# Patient Record
Sex: FEMALE | Race: WHITE | NOT HISPANIC OR LATINO | Employment: FULL TIME | URBAN - METROPOLITAN AREA
[De-identification: names, ages, dates, MRNs, and addresses within clinical notes are randomized per-mention and may not be internally consistent; named-entity substitution may affect disease eponyms.]

---
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
## 2019-02-25 ENCOUNTER — OFFICE VISIT (OUTPATIENT)
Dept: FAMILY MEDICINE | Facility: CLINIC | Age: 19
End: 2019-02-25
Payer: COMMERCIAL

## 2019-02-25 VITALS
OXYGEN SATURATION: 99 % | TEMPERATURE: 97.8 F | WEIGHT: 180.2 LBS | SYSTOLIC BLOOD PRESSURE: 118 MMHG | HEIGHT: 67 IN | RESPIRATION RATE: 18 BRPM | DIASTOLIC BLOOD PRESSURE: 84 MMHG | BODY MASS INDEX: 28.28 KG/M2 | HEART RATE: 101 BPM

## 2019-02-25 DIAGNOSIS — J02.9 PHARYNGITIS, UNSPECIFIED ETIOLOGY: Primary | ICD-10-CM

## 2019-02-25 DIAGNOSIS — J01.00 ACUTE NON-RECURRENT MAXILLARY SINUSITIS: ICD-10-CM

## 2019-02-25 LAB
DEPRECATED S PYO AG THROAT QL EIA: NORMAL
HETEROPH AB SER QL: NEGATIVE
SPECIMEN SOURCE: NORMAL

## 2019-02-25 PROCEDURE — 87880 STREP A ASSAY W/OPTIC: CPT | Performed by: NURSE PRACTITIONER

## 2019-02-25 PROCEDURE — 87081 CULTURE SCREEN ONLY: CPT | Performed by: NURSE PRACTITIONER

## 2019-02-25 PROCEDURE — 86308 HETEROPHILE ANTIBODY SCREEN: CPT | Performed by: NURSE PRACTITIONER

## 2019-02-25 PROCEDURE — 36415 COLL VENOUS BLD VENIPUNCTURE: CPT | Performed by: NURSE PRACTITIONER

## 2019-02-25 PROCEDURE — 99213 OFFICE O/P EST LOW 20 MIN: CPT | Performed by: NURSE PRACTITIONER

## 2019-02-25 RX ORDER — AMOXICILLIN 500 MG/1
500 CAPSULE ORAL 3 TIMES DAILY
Qty: 21 CAPSULE | Refills: 0 | Status: SHIPPED | OUTPATIENT
Start: 2019-02-25 | End: 2019-03-04

## 2019-02-25 ASSESSMENT — MIFFLIN-ST. JEOR: SCORE: 1616.04

## 2019-02-25 NOTE — PROGRESS NOTES
"  SUBJECTIVE:   Tiana Sumner is a 19 year old female who presents to clinic today for the following health issues:    ENT Symptoms             Symptoms: cc Present Absent Comment   Fever/Chills  x  Chills, sweating last night    Fatigue  x     Muscle Aches  x     Eye Irritation   x    Sneezing   x    Nasal Hermilo/Drg   x    Sinus Pressure/Pain   x    Loss of smell   x    Dental pain   x    Sore Throat  x     Swollen Glands  x     Ear Pain/Fullness  x  Otalgia both ears    Cough   x    Wheeze   x    Chest Pain   x    Shortness of breath   x    Rash   x    Other   x      Symptom duration: 5 days    Symptom severity:  moderate    Treatments tried:  dayquil, advil cold and sinus    Contacts:  work at [employer]      Problem list and histories reviewed & adjusted, as indicated.  Additional history: as documented    Labs reviewed in EPIC    Reviewed and updated as needed this visit by clinical staff       Reviewed and updated as needed this visit by Provider         ROS:  Constitutional, HEENT, cardiovascular, pulmonary, gi and gu systems are negative, except as otherwise noted.    OBJECTIVE:     /84 (BP Location: Left arm, Patient Position: Sitting, Cuff Size: Adult Regular)   Pulse 101   Temp 97.8  F (36.6  C) (Tympanic)   Resp 18   Ht 1.695 m (5' 6.75\")   Wt 81.7 kg (180 lb 3.2 oz)   SpO2 99%   BMI 28.44 kg/m    Body mass index is 28.44 kg/m .  GENERAL: healthy, alert and no distress  EYES: Eyes grossly normal to inspection, PERRL and conjunctivae and sclerae normal  HENT: ear canals and TM's normal, nose and mouth without ulcers or lesions, tonsillar erythema   NECK: bilateral anterior cervical adenopathy  RESP: lungs clear to auscultation - no rales, rhonchi or wheezes  CV: regular rate and rhythm, normal S1 S2, no S3 or S4, no murmur, click or rub, no peripheral edema and peripheral pulses strong  SKIN: no suspicious lesions or rashes    Diagnostic Test Results:  Results for orders placed or performed " in visit on 02/25/19 (from the past 24 hour(s))   Strep, Rapid Screen   Result Value Ref Range    Specimen Description Throat     Rapid Strep A Screen       NEGATIVE: No Group A streptococcal antigen detected by immunoassay, await culture report.   Mononucleosis screen   Result Value Ref Range    Mononucleosis Screen Negative NEG^Negative       ASSESSMENT/PLAN:     1. Pharyngitis, unspecified etiology  -sore throat can be due to post-nasal drainage  -discussed symptomatic treatment   - Strep, Rapid Screen  - Beta strep group A culture  - Mononucleosis screen    2. Acute non-recurrent maxillary sinusitis    - amoxicillin (AMOXIL) 500 MG capsule; Take 1 capsule (500 mg) by mouth 3 times daily for 7 days  Dispense: 21 capsule; Refill: 0    See Patient Instructions    KATELYN Blevins [facility]

## 2019-02-25 NOTE — LETTER
March 1, 2019      Tiana Sumner  [address] [facility] [address]        The results of your recent throat culture were negative.  If you have any further questions or concerns please contact the clinic.            Sincerely,        KATELYN Blevins CNP/dk

## 2019-02-25 NOTE — PATIENT INSTRUCTIONS
Mono test  If negative will start Amoxicillin 500 mg 3 times daily for 7 days for sinus infection  Symptomatic treatment: Cepacol lozenges as needed, drink more fluids

## 2019-02-25 NOTE — LETTER
[facility]  [address] [facility] [address]  Phone: [phone]    February 25, 2019        Tiana Sumner  [id number] [facility] [address]          To whom it may concern:    RE: Tiana Sumner    Patient was seen and treated today at our clinic and missed work.  Patient may return to work 2/28 th  with the following:  No restrictions    Please contact me for questions or concerns.      Sincerely,        KATELYN Blevins CNP

## 2019-02-26 ENCOUNTER — TELEPHONE (OUTPATIENT)
Dept: FAMILY MEDICINE | Facility: CLINIC | Age: 19
End: 2019-02-26

## 2019-02-26 LAB
BACTERIA SPEC CULT: NORMAL
SPECIMEN SOURCE: NORMAL

## 2019-02-26 NOTE — TELEPHONE ENCOUNTER
Panel Management Review      Patient has the following on her problem list:     Depression / Dysthymia review    Measure:  Needs PHQ-9 score of 4 or less during index window.  Administer PHQ-9 and if score is 5 or more, send encounter to provider for next steps.      PHQ-9 SCORE 11/1/2017 11/28/2017 3/5/2018   PHQ-9 Total Score 16 2 1       If PHQ-9 recheck is 5 or more, route to provider for next steps.    Patient is due for:  PHQ9    Asthma review     ACT Total Scores 8/29/2017   ACT TOTAL SCORE -   ASTHMA ER VISITS -   ASTHMA HOSPITALIZATIONS -   ACT TOTAL SCORE (Goal Greater than or Equal to 20) 25   In the past 12 months, how many times did you visit the emergency room for your asthma without being admitted to the hospital? 0   In the past 12 months, how many times were you hospitalized overnight because of your asthma? 0      1. Is Asthma diagnosis on the Problem List? Yes    2. Is Asthma listed on Health Maintenance? Yes    3. Patient is due for:  ACT      Composite cancer screening  Chart review shows that this patient is due/due soon for the following None  Summary:    Patient is due/failing the following:   ACT and PHQ9    Action needed:   Patient needs to do ACT. and Patient needs to do PHQ9.    Type of outreach:    Letter mailed with questionnaires for the patient to complete and return.    Questions for provider review:    None                                                                                                                                    MYKEL Chacon MA

## 2019-02-26 NOTE — LETTER
February 26, 2019      Tiana Sumner  [address] [facility] [address]        Dear Tiana,     In order to ensure we are providing the best quality care, we have reviewed your chart and see that you are due for:  An update for the depression/anxiety and asthma questionnaires.  These tools help your provider to monitor and manage your symptoms and treatment plan.  Please complete the enclosed forms and return in the provided envelope.    Please call the clinic with any questions or concerns.    Thank you for trusting us with your health care.  Sincerely,    Your [facility] Team/lw

## 2019-04-01 ENCOUNTER — RECORDS - HEALTHEAST (OUTPATIENT)
Dept: LAB | Facility: HOSPITAL | Age: 19
End: 2019-04-01

## 2019-04-03 ENCOUNTER — OFFICE VISIT (OUTPATIENT)
Dept: FAMILY MEDICINE | Facility: CLINIC | Age: 19
End: 2019-04-03
Payer: COMMERCIAL

## 2019-04-03 VITALS
WEIGHT: 178.6 LBS | HEART RATE: 61 BPM | HEIGHT: 67 IN | DIASTOLIC BLOOD PRESSURE: 70 MMHG | RESPIRATION RATE: 18 BRPM | BODY MASS INDEX: 28.03 KG/M2 | SYSTOLIC BLOOD PRESSURE: 122 MMHG | TEMPERATURE: 97.7 F | OXYGEN SATURATION: 99 %

## 2019-04-03 DIAGNOSIS — F41.1 GENERALIZED ANXIETY DISORDER: Primary | ICD-10-CM

## 2019-04-03 LAB
GAMMA INTERFERON BACKGROUND BLD IA-ACNC: 0.02 IU/ML
M TB IFN-G BLD-IMP: NEGATIVE
MITOGEN IGNF BCKGRD COR BLD-ACNC: 0 IU/ML
MITOGEN IGNF BCKGRD COR BLD-ACNC: 0 IU/ML
QTF INTERPRETATION: NORMAL
QTF MITOGEN - NIL: 8.01 IU/ML

## 2019-04-03 PROCEDURE — 99213 OFFICE O/P EST LOW 20 MIN: CPT | Performed by: NURSE PRACTITIONER

## 2019-04-03 RX ORDER — BUSPIRONE HYDROCHLORIDE 10 MG/1
10 TABLET ORAL 2 TIMES DAILY
Qty: 60 TABLET | Refills: 2 | Status: SHIPPED | OUTPATIENT
Start: 2019-04-03 | End: 2019-06-05

## 2019-04-03 ASSESSMENT — ANXIETY QUESTIONNAIRES
2. NOT BEING ABLE TO STOP OR CONTROL WORRYING: NEARLY EVERY DAY
1. FEELING NERVOUS, ANXIOUS, OR ON EDGE: NEARLY EVERY DAY
5. BEING SO RESTLESS THAT IT IS HARD TO SIT STILL: NEARLY EVERY DAY
3. WORRYING TOO MUCH ABOUT DIFFERENT THINGS: NEARLY EVERY DAY
6. BECOMING EASILY ANNOYED OR IRRITABLE: NEARLY EVERY DAY
IF YOU CHECKED OFF ANY PROBLEMS ON THIS QUESTIONNAIRE, HOW DIFFICULT HAVE THESE PROBLEMS MADE IT FOR YOU TO DO YOUR WORK, TAKE CARE OF THINGS AT HOME, OR GET ALONG WITH OTHER PEOPLE: EXTREMELY DIFFICULT
7. FEELING AFRAID AS IF SOMETHING AWFUL MIGHT HAPPEN: NEARLY EVERY DAY
GAD7 TOTAL SCORE: 21

## 2019-04-03 ASSESSMENT — PATIENT HEALTH QUESTIONNAIRE - PHQ9
SUM OF ALL RESPONSES TO PHQ QUESTIONS 1-9: 7
5. POOR APPETITE OR OVEREATING: NEARLY EVERY DAY

## 2019-04-03 ASSESSMENT — MIFFLIN-ST. JEOR: SCORE: 1608.78

## 2019-04-03 NOTE — PROGRESS NOTES
"  SUBJECTIVE:   Tiana Sumner is a 19 year old female who presents to clinic today for the following health issues:    Chief Complaint   Patient presents with     Anxiety     Recheck, has tried Prozac and Zoloft and they have not worked for her in the past, wants to discuss getting on something else       Anxiety Follow-Up    Status since last visit: Worsened- having anxiety all the time not just situational     Other associated symptoms:None    Complicating factors:     Significant life event: Yes-  New job      Current substance abuse: None    PHQ 11/28/2017 3/5/2018 4/3/2019   PHQ-9 Total Score 2 1 7   Q9: Thoughts of better off dead/self-harm past 2 weeks Not at all Not at all Not at all     JAZMIN-7 SCORE 11/1/2017 11/28/2017 4/3/2019   Total Score 21 9 21     In the past two weeks have you had thoughts of suicide or self-harm?  No.    Do you have concerns about your personal safety or the safety of others?   No  PHQ-9  English  PHQ-9   Any Language  JAZMIN-7  Suicide Assessment Five-step Evaluation and Treatment (SAFE-T)    Amount of exercise or physical activity: 2-3 days/week for an average of greater than 60 minutes    Problems taking medications regularly: No    Medication side effects: none    Diet: has been trying to eat healthier     Problem list and histories reviewed & adjusted, as indicated.  Additional history: as documented    Labs reviewed in EPIC    Reviewed and updated as needed this visit by clinical staff  Tobacco  Allergies  Meds  Med Hx  Surg Hx  Fam Hx  Soc Hx      Reviewed and updated as needed this visit by Provider         ROS:  Constitutional, HEENT, cardiovascular, pulmonary, gi and gu systems are negative, except as otherwise noted.    OBJECTIVE:     /70 (BP Location: Left arm, Patient Position: Sitting, Cuff Size: Adult Regular)   Pulse 61   Temp 97.7  F (36.5  C) (Tympanic)   Resp 18   Ht 1.695 m (5' 6.75\")   Wt 81 kg (178 lb 9.6 oz)   SpO2 99%   BMI 28.18 kg/m  "   Body mass index is 28.18 kg/m .  GENERAL: healthy, alert and no distress  PSYCH: mentation appears normal, affect normal/bright    Diagnostic Test Results:  none     ASSESSMENT/PLAN:     1. Generalized anxiety disorder    - busPIRone (BUSPAR) 10 MG tablet; Take 1 tablet (10 mg) by mouth 2 times daily  Dispense: 60 tablet; Refill: 2  -follow up in 4-6 weeks if no improvement     See Patient Instructions    KATELYN Blevins [facility]

## 2019-04-04 ASSESSMENT — ASTHMA QUESTIONNAIRES: ACT_TOTALSCORE: 24

## 2019-04-04 ASSESSMENT — ANXIETY QUESTIONNAIRES: GAD7 TOTAL SCORE: 21

## 2019-04-14 ENCOUNTER — TELEPHONE (OUTPATIENT)
Dept: FAMILY MEDICINE | Facility: CLINIC | Age: 19
End: 2019-04-14

## 2019-04-14 DIAGNOSIS — N94.3 PMS (PREMENSTRUAL SYNDROME): ICD-10-CM

## 2019-04-14 DIAGNOSIS — N94.6 DYSMENORRHEA: ICD-10-CM

## 2019-04-15 RX ORDER — LEVONORGESTREL AND ETHINYL ESTRADIOL 0.1-0.02MG
KIT ORAL
Qty: 84 TABLET | Refills: 0 | Status: SHIPPED | OUTPATIENT
Start: 2019-04-15 | End: 2019-06-05

## 2019-04-15 NOTE — TELEPHONE ENCOUNTER
"Called her, advised she is due for annual exam, and some screenings/ health maintenance.   \"oh, ok, sure, I can do that. \"  Advised we can refill one more time and to please plan to be seen before additional refills.   Prescription approved per [facility] Refill Protocol.  Aminah Anand RNC    "

## 2019-04-15 NOTE — TELEPHONE ENCOUNTER
"Requested Prescriptions   Pending Prescriptions Disp Refills     FALMINA 0.1-20 MG-MCG tablet [Pharmacy Med Name: FALMINA 0.1-20MG-MCG TABS] 84 tablet 2     Sig: TAKE ONE TABLET BY MOUTH EVERY DAY   Last Written Prescription Date:  8/6/18  Last Fill Quantity: 84 tab,  # refills: 2   Last office visit: 4/3/2019 with prescribing provider:  Tonio   Future Office Visit:        Contraceptives Protocol Passed - 4/14/2019  9:32 AM        Passed - Patient is not a current smoker if age is 35 or older        Passed - Recent (12 mo) or future (30 days) visit within the authorizing provider's specialty     Patient had office visit in the last 12 months or has a visit in the next 30 days with authorizing provider or within the authorizing provider's specialty.  See \"Patient Info\" tab in inbasket, or \"Choose Columns\" in Meds & Orders section of the refill encounter.              Passed - Medication is active on med list        Passed - No active pregnancy on record        Passed - No positive pregnancy test in past 12 months          "

## 2019-04-15 NOTE — TELEPHONE ENCOUNTER
Pt is calling asking asking about this Rx as she will need to start the new pack tomorrow  Farida Elliott on 4/15/2019 at 4:40 PM

## 2019-06-05 ENCOUNTER — OFFICE VISIT (OUTPATIENT)
Dept: FAMILY MEDICINE | Facility: CLINIC | Age: 19
End: 2019-06-05
Payer: COMMERCIAL

## 2019-06-05 VITALS
OXYGEN SATURATION: 99 % | HEIGHT: 67 IN | DIASTOLIC BLOOD PRESSURE: 80 MMHG | RESPIRATION RATE: 18 BRPM | TEMPERATURE: 97.7 F | BODY MASS INDEX: 26.89 KG/M2 | HEART RATE: 88 BPM | WEIGHT: 171.3 LBS | SYSTOLIC BLOOD PRESSURE: 121 MMHG

## 2019-06-05 DIAGNOSIS — N94.6 DYSMENORRHEA: ICD-10-CM

## 2019-06-05 DIAGNOSIS — N94.3 PMS (PREMENSTRUAL SYNDROME): ICD-10-CM

## 2019-06-05 DIAGNOSIS — F32.A ANXIETY AND DEPRESSION: Primary | ICD-10-CM

## 2019-06-05 DIAGNOSIS — F41.9 ANXIETY AND DEPRESSION: Primary | ICD-10-CM

## 2019-06-05 PROCEDURE — 99213 OFFICE O/P EST LOW 20 MIN: CPT | Performed by: NURSE PRACTITIONER

## 2019-06-05 RX ORDER — VENLAFAXINE HYDROCHLORIDE 37.5 MG/1
TABLET, EXTENDED RELEASE ORAL
Qty: 60 TABLET | Refills: 1 | Status: SHIPPED | OUTPATIENT
Start: 2019-06-05 | End: 2019-08-07

## 2019-06-05 RX ORDER — LEVONORGESTREL/ETHIN.ESTRADIOL 0.1-0.02MG
1 TABLET ORAL DAILY
Qty: 84 TABLET | Refills: 3 | Status: SHIPPED | OUTPATIENT
Start: 2019-06-05 | End: 2019-08-07

## 2019-06-05 ASSESSMENT — MIFFLIN-ST. JEOR: SCORE: 1575.67

## 2019-06-05 NOTE — PATIENT INSTRUCTIONS
Start Effexor 37.5 mg daily for 1 week and than increase to 2 tablets, or 75 mg daily    Follow up in 6 weeks     Perla, counselor

## 2019-06-05 NOTE — PROGRESS NOTES
Subjective     Tiana Sumner is a 19 year old female who presents to clinic today for the following health issues: anxiety is worse, tried Buspar without any improvement, was also on Prozac and Zoloft in the past without improvement. States she is worried about everything and gets anxious very easily.     Chief Complaint   Patient presents with     Anxiety     Follow up on Anxiety, taking Buspar and her anxiety has worsened      Refill Request     Birth Control      HPI   Depression and Anxiety Follow-Up    How are you doing with your depression since your last visit? No change    How are you doing with your anxiety since your last visit?  Worsened- nothing has changed since her last visit    Are you having other symptoms that might be associated with depression or anxiety? Yes:  job is stressful     Have you had a significant life event? No     Do you have any concerns with your use of alcohol or other drugs? No    Social History     Tobacco Use     Smoking status: Never Smoker     Smokeless tobacco: Never Used   Substance Use Topics     Alcohol use: No     Frequency: Never     Drug use: No     PHQ 11/28/2017 3/5/2018 4/3/2019   PHQ-9 Total Score 2 1 7   Q9: Thoughts of better off dead/self-harm past 2 weeks Not at all Not at all Not at all     JAZMIN-7 SCORE 11/1/2017 11/28/2017 4/3/2019   Total Score 21 9 21       In the past two weeks have you had thoughts of suicide or self-harm?  No.    Do you have concerns about your personal safety or the safety of others?   No    Suicide Assessment Five-step Evaluation and Treatment (SAFE-T)    Amount of exercise or physical activity: 4-5 days/week for an average of 45-60 minutes    Problems taking medications regularly: No    Medication side effects: none    Diet: regular (no restrictions)    BP Readings from Last 3 Encounters:   06/05/19 121/80   04/03/19 122/70   02/25/19 118/84    Wt Readings from Last 3 Encounters:   06/05/19 77.7 kg (171 lb 4.8 oz)   04/03/19 81 kg  "(178 lb 9.6 oz)   02/25/19 81.7 kg (180 lb 3.2 oz)            Reviewed and updated as needed this visit by Provider         Review of Systems   ROS COMP: Constitutional, HEENT, cardiovascular, pulmonary, gi and gu systems are negative, except as otherwise noted.      Objective    /80 (BP Location: Left arm, Patient Position: Sitting, Cuff Size: Adult Regular)   Pulse 88   Temp 97.7  F (36.5  C) (Tympanic)   Resp 18   Ht 1.695 m (5' 6.75\")   Wt 77.7 kg (171 lb 4.8 oz)   SpO2 99%   BMI 27.03 kg/m    Body mass index is 27.03 kg/m .  Physical Exam   GENERAL: healthy, alert and no distress  PSYCH: mentation appears normal, affect normal/bright    Diagnostic Test Results:  Labs reviewed in Epic        Assessment & Plan     1. Anxiety and depression    - venlafaxine (EFFEXOR-ER) 37.5 MG 24 hr tablet; Take 1 tablet daily morning for 1 week, than increase to 2 tablet daily  Dispense: 60 tablet; Refill: 1  -follow up in 4-6 weeks     2. Dysmenorrhea    - levonorgestrel-ethinyl estradiol (FALMINA) 0.1-20 MG-MCG tablet; Take 1 tablet by mouth daily  Dispense: 84 tablet; Refill: 3    3. PMS (premenstrual syndrome)  - levonorgestrel-ethinyl estradiol (FALMINA) 0.1-20 MG-MCG tablet; Take 1 tablet by mouth daily  Dispense: 84 tablet; Refill: 3     See Patient Instructions    Return in about 6 weeks (around 7/17/2019) for depression, anxiety .    KATELYN Blevins [facility]      "

## 2019-08-07 ENCOUNTER — OFFICE VISIT (OUTPATIENT)
Dept: FAMILY MEDICINE | Facility: CLINIC | Age: 19
End: 2019-08-07
Payer: COMMERCIAL

## 2019-08-07 VITALS
BODY MASS INDEX: 26.51 KG/M2 | RESPIRATION RATE: 18 BRPM | DIASTOLIC BLOOD PRESSURE: 66 MMHG | HEIGHT: 67 IN | TEMPERATURE: 98.4 F | HEART RATE: 73 BPM | SYSTOLIC BLOOD PRESSURE: 108 MMHG | OXYGEN SATURATION: 98 % | WEIGHT: 168.9 LBS

## 2019-08-07 DIAGNOSIS — F32.A ANXIETY AND DEPRESSION: Primary | ICD-10-CM

## 2019-08-07 DIAGNOSIS — N94.6 DYSMENORRHEA: ICD-10-CM

## 2019-08-07 DIAGNOSIS — F41.9 ANXIETY AND DEPRESSION: Primary | ICD-10-CM

## 2019-08-07 DIAGNOSIS — N94.3 PMS (PREMENSTRUAL SYNDROME): ICD-10-CM

## 2019-08-07 PROCEDURE — 99213 OFFICE O/P EST LOW 20 MIN: CPT | Performed by: NURSE PRACTITIONER

## 2019-08-07 RX ORDER — LEVONORGESTREL/ETHIN.ESTRADIOL 0.1-0.02MG
1 TABLET ORAL DAILY
Qty: 84 TABLET | Refills: 3 | Status: SHIPPED | OUTPATIENT
Start: 2019-08-07 | End: 2020-08-14

## 2019-08-07 RX ORDER — VENLAFAXINE HYDROCHLORIDE 37.5 MG/1
TABLET, EXTENDED RELEASE ORAL
Qty: 60 TABLET | Refills: 1 | Status: CANCELLED | OUTPATIENT
Start: 2019-08-07

## 2019-08-07 RX ORDER — VENLAFAXINE HYDROCHLORIDE 75 MG/1
75 TABLET, EXTENDED RELEASE ORAL DAILY
Qty: 90 TABLET | Refills: 3 | Status: SHIPPED | OUTPATIENT
Start: 2019-08-07 | End: 2020-08-14

## 2019-08-07 ASSESSMENT — ANXIETY QUESTIONNAIRES
6. BECOMING EASILY ANNOYED OR IRRITABLE: NOT AT ALL
GAD7 TOTAL SCORE: 4
5. BEING SO RESTLESS THAT IT IS HARD TO SIT STILL: NOT AT ALL
3. WORRYING TOO MUCH ABOUT DIFFERENT THINGS: SEVERAL DAYS
4. TROUBLE RELAXING: NOT AT ALL
1. FEELING NERVOUS, ANXIOUS, OR ON EDGE: SEVERAL DAYS
2. NOT BEING ABLE TO STOP OR CONTROL WORRYING: SEVERAL DAYS
IF YOU CHECKED OFF ANY PROBLEMS ON THIS QUESTIONNAIRE, HOW DIFFICULT HAVE THESE PROBLEMS MADE IT FOR YOU TO DO YOUR WORK, TAKE CARE OF THINGS AT HOME, OR GET ALONG WITH OTHER PEOPLE: SOMEWHAT DIFFICULT
7. FEELING AFRAID AS IF SOMETHING AWFUL MIGHT HAPPEN: SEVERAL DAYS

## 2019-08-07 ASSESSMENT — MIFFLIN-ST. JEOR: SCORE: 1564.79

## 2019-08-07 ASSESSMENT — PATIENT HEALTH QUESTIONNAIRE - PHQ9: SUM OF ALL RESPONSES TO PHQ QUESTIONS 1-9: 3

## 2019-08-07 NOTE — PROGRESS NOTES
"Subjective     Tiana Sumner is a 19 year old female who presents to clinic today for the following health issues: anxiety and depression improved since started Effexor.     Chief Complaint   Patient presents with     Anxiety     Depression     Refill Request     Effexor      HPI   Depression and Anxiety Follow-Up    How are you doing with your depression since your last visit? Improved- is doing well on Effexor     How are you doing with your anxiety since your last visit?  Improved     Are you having other symptoms that might be associated with depression or anxiety? No    Have you had a significant life event? No     Do you have any concerns with your use of alcohol or other drugs? No    Social History     Tobacco Use     Smoking status: Never Smoker     Smokeless tobacco: Never Used   Substance Use Topics     Alcohol use: No     Frequency: Never     Drug use: No     PHQ 3/5/2018 4/3/2019 8/7/2019   PHQ-9 Total Score 1 7 3   Q9: Thoughts of better off dead/self-harm past 2 weeks Not at all Not at all Not at all     JAZMIN-7 SCORE 11/28/2017 4/3/2019 8/7/2019   Total Score 9 21 4       Suicide Assessment Five-step Evaluation and Treatment (SAFE-T)    Amount of exercise or physical activity: 4-5 days/week for an average of greater than 60 minutes    Problems taking medications regularly: No    Medication side effects: none    Diet: regular (no restrictions)    Reviewed and updated as needed this visit by Provider         Review of Systems   ROS COMP: Constitutional, HEENT, cardiovascular, pulmonary, gi and gu systems are negative, except as otherwise noted.      Objective    /66 (BP Location: Left arm, Patient Position: Sitting, Cuff Size: Adult Regular)   Pulse 73   Temp 98.4  F (36.9  C) (Tympanic)   Resp 18   Ht 1.695 m (5' 6.75\")   Wt 76.6 kg (168 lb 14.4 oz)   SpO2 98%   BMI 26.65 kg/m    Body mass index is 26.65 kg/m .  Physical Exam   GENERAL: healthy, alert and no distress  PSYCH: mentation " appears normal, affect normal/bright    Diagnostic Test Results:  Labs reviewed in Epic        Assessment & Plan     1. Dysmenorrhea    - levonorgestrel-ethinyl estradiol (FALMINA) 0.1-20 MG-MCG tablet; Take 1 tablet by mouth daily  Dispense: 84 tablet; Refill: 3    2. PMS (premenstrual syndrome)  -no side effects, refill provided   - levonorgestrel-ethinyl estradiol (FALMINA) 0.1-20 MG-MCG tablet; Take 1 tablet by mouth daily  Dispense: 84 tablet; Refill: 3    3. Anxiety and depression  -symptoms improved, continue Effexor 75 mg daily AM   - venlafaxine (EFFEXOR-ER) 75 MG 24 hr tablet; Take 1 tablet (75 mg) by mouth daily  Dispense: 90 tablet; Refill: 3     See Patient Instructions    Return in about 1 year (around 8/7/2020) for Routine Visit.    KATELYN Blevins CNP  [facility]

## 2019-08-08 ASSESSMENT — ANXIETY QUESTIONNAIRES: GAD7 TOTAL SCORE: 4

## 2020-08-04 DIAGNOSIS — F41.9 ANXIETY AND DEPRESSION: ICD-10-CM

## 2020-08-04 DIAGNOSIS — F32.A ANXIETY AND DEPRESSION: ICD-10-CM

## 2020-08-04 NOTE — TELEPHONE ENCOUNTER
Routing refill request to provider for review/approval because:  Patient needs to be seen because it has been more than 1 year since last office visit.    Elsie Caballero RN

## 2020-08-11 RX ORDER — VENLAFAXINE HYDROCHLORIDE 75 MG/1
CAPSULE, EXTENDED RELEASE ORAL
Qty: 90 CAPSULE | Refills: 3 | OUTPATIENT
Start: 2020-08-11

## 2020-08-14 ENCOUNTER — OFFICE VISIT (OUTPATIENT)
Dept: FAMILY MEDICINE | Facility: CLINIC | Age: 20
End: 2020-08-14
Payer: COMMERCIAL

## 2020-08-14 VITALS
WEIGHT: 184 LBS | TEMPERATURE: 98.2 F | HEART RATE: 61 BPM | OXYGEN SATURATION: 98 % | HEIGHT: 66 IN | BODY MASS INDEX: 29.57 KG/M2 | SYSTOLIC BLOOD PRESSURE: 122 MMHG | DIASTOLIC BLOOD PRESSURE: 82 MMHG

## 2020-08-14 DIAGNOSIS — F41.1 GAD (GENERALIZED ANXIETY DISORDER): ICD-10-CM

## 2020-08-14 DIAGNOSIS — N94.6 DYSMENORRHEA: ICD-10-CM

## 2020-08-14 DIAGNOSIS — F32.0 MILD MAJOR DEPRESSION (H): ICD-10-CM

## 2020-08-14 DIAGNOSIS — N94.3 PMS (PREMENSTRUAL SYNDROME): ICD-10-CM

## 2020-08-14 DIAGNOSIS — J45.20 MILD INTERMITTENT ASTHMA WITHOUT COMPLICATION: Primary | ICD-10-CM

## 2020-08-14 PROCEDURE — 99214 OFFICE O/P EST MOD 30 MIN: CPT | Performed by: NURSE PRACTITIONER

## 2020-08-14 RX ORDER — ALBUTEROL SULFATE 90 UG/1
2 AEROSOL, METERED RESPIRATORY (INHALATION) EVERY 6 HOURS
Qty: 1 INHALER | Refills: 5 | Status: SHIPPED | OUTPATIENT
Start: 2020-08-14 | End: 2020-11-23

## 2020-08-14 RX ORDER — VENLAFAXINE HYDROCHLORIDE 37.5 MG/1
37.5 TABLET, EXTENDED RELEASE ORAL DAILY
Qty: 90 TABLET | Refills: 1 | Status: SHIPPED | OUTPATIENT
Start: 2020-08-14 | End: 2020-10-29 | Stop reason: DRUGHIGH

## 2020-08-14 RX ORDER — LEVONORGESTREL/ETHIN.ESTRADIOL 0.1-0.02MG
1 TABLET ORAL DAILY
Qty: 84 TABLET | Refills: 3 | Status: SHIPPED | OUTPATIENT
Start: 2020-08-14 | End: 2021-07-14

## 2020-08-14 ASSESSMENT — ANXIETY QUESTIONNAIRES
1. FEELING NERVOUS, ANXIOUS, OR ON EDGE: SEVERAL DAYS
2. NOT BEING ABLE TO STOP OR CONTROL WORRYING: NOT AT ALL
6. BECOMING EASILY ANNOYED OR IRRITABLE: SEVERAL DAYS
5. BEING SO RESTLESS THAT IT IS HARD TO SIT STILL: NOT AT ALL
4. TROUBLE RELAXING: NOT AT ALL
3. WORRYING TOO MUCH ABOUT DIFFERENT THINGS: SEVERAL DAYS
GAD7 TOTAL SCORE: 4
7. FEELING AFRAID AS IF SOMETHING AWFUL MIGHT HAPPEN: SEVERAL DAYS

## 2020-08-14 ASSESSMENT — PATIENT HEALTH QUESTIONNAIRE - PHQ9: SUM OF ALL RESPONSES TO PHQ QUESTIONS 1-9: 2

## 2020-08-14 ASSESSMENT — ASTHMA QUESTIONNAIRES
QUESTION_4 LAST FOUR WEEKS HOW OFTEN HAVE YOU USED YOUR RESCUE INHALER OR NEBULIZER MEDICATION (SUCH AS ALBUTEROL): TWO OR THREE TIMES PER WEEK
QUESTION_1 LAST FOUR WEEKS HOW MUCH OF THE TIME DID YOUR ASTHMA KEEP YOU FROM GETTING AS MUCH DONE AT WORK, SCHOOL OR AT HOME: A LITTLE OF THE TIME
QUESTION_5 LAST FOUR WEEKS HOW WOULD YOU RATE YOUR ASTHMA CONTROL: WELL CONTROLLED
ACT_TOTALSCORE: 20
QUESTION_2 LAST FOUR WEEKS HOW OFTEN HAVE YOU HAD SHORTNESS OF BREATH: ONCE OR TWICE A WEEK
QUESTION_3 LAST FOUR WEEKS HOW OFTEN DID YOUR ASTHMA SYMPTOMS (WHEEZING, COUGHING, SHORTNESS OF BREATH, CHEST TIGHTNESS OR PAIN) WAKE YOU UP AT NIGHT OR EARLIER THAN USUAL IN THE MORNING: NOT AT ALL

## 2020-08-14 ASSESSMENT — MIFFLIN-ST. JEOR: SCORE: 1616.37

## 2020-08-14 NOTE — PROGRESS NOTES
Subjective     Tiana Sumner is a 20 year old female who presents to clinic today for the following health issues:    HPI     Chief Complaint   Patient presents with     Depression     Pt would like refill of Effexor.     Contraception     Pt would like to discuss birth control, pt is having more heavy menstrual cycles and is having more cramping. Pt is stating that it came a week early this week and hasnt been consistant. Having longer periods, inconstitance.      Asthma     Refill on albuterol, needs as needed for excercise.      Depression and Anxiety Follow-Up    How are you doing with your depression since your last visit? Improved pt was curious if she could possibly go down a little     How are you doing with your anxiety since your last visit?  Improved     Are you having other symptoms that might be associated with depression or anxiety? Yes:  panic attacks everyonce in a while, a lot more controlled. Excercising more frequently to help.    Have you had a significant life event? No     Do you have any concerns with your use of alcohol or other drugs? No    Social History     Tobacco Use     Smoking status: Never Smoker     Smokeless tobacco: Never Used   Substance Use Topics     Alcohol use: No     Frequency: Never     Drug use: No     PHQ 4/3/2019 8/7/2019 8/14/2020   PHQ-9 Total Score 7 3 2   Q9: Thoughts of better off dead/self-harm past 2 weeks Not at all Not at all Not at all     JAZMIN-7 SCORE 4/3/2019 8/7/2019 8/14/2020   Total Score 21 4 4       How many servings of fruits and vegetables do you eat daily?  2-3    On average, how many sweetened beverages do you drink each day (Examples: soda, juice, sweet tea, etc.  Do NOT count diet or artificially sweetened beverages)?   0    How many days per week do you exercise enough to make your heart beat faster? 6    How many minutes a day do you exercise enough to make your heart beat faster? 60 or more    How many days per week do you miss taking your  "medication? 0    Asthma Follow-Up    Was ACT completed today?    Yes    ACT Total Scores 8/14/2020   ACT TOTAL SCORE -   ASTHMA ER VISITS -   ASTHMA HOSPITALIZATIONS -   ACT TOTAL SCORE (Goal Greater than or Equal to 20) 20   In the past 12 months, how many times did you visit the emergency room for your asthma without being admitted to the hospital? 0   In the past 12 months, how many times were you hospitalized overnight because of your asthma? 0         How many days per week do you miss taking your asthma controller medication?  I do not have an asthma controller medication    Please describe any recent triggers for your asthma: upper respiratory infections and exercise or sports    Have you had any Emergency Room Visits, Urgent Care Visits, or Hospital Admissions since your last office visit?  No    Reviewed and updated as needed this visit by Provider  Tobacco  Allergies  Meds  Problems  Med Hx  Surg Hx  Fam Hx         Review of Systems   Constitutional, HEENT, cardiovascular, pulmonary, gi and gu systems are negative, except as otherwise noted.      Objective    /82   Pulse 61   Temp 98.2  F (36.8  C) (Tympanic)   Ht 1.676 m (5' 6\")   Wt 83.5 kg (184 lb)   LMP 08/11/2020 (Exact Date)   SpO2 98%   BMI 29.70 kg/m    Body mass index is 29.7 kg/m .  Physical Exam   GENERAL: healthy, alert and no distress  EYES: Eyes grossly normal to inspection, PERRL and conjunctivae and sclerae normal  RESP: lungs clear to auscultation - no rales, rhonchi or wheezes  NEURO: Normal strength and tone, mentation intact and speech normal  PSYCH: mentation appears normal, affect normal/bright    Diagnostic Test Results:  Labs reviewed in Epic        Assessment & Plan     1. JAZMIN (generalized anxiety disorder)  -well controlled   - venlafaxine (EFFEXOR-ER) 37.5 MG 24 hr tablet; Take 1 tablet (37.5 mg) by mouth daily  Dispense: 90 tablet; Refill: 1    2. Mild major depression (H)  -well controlled, patient would " like to change Effexor to lower dose, reduced to 37.5 mg daily   - venlafaxine (EFFEXOR-ER) 37.5 MG 24 hr tablet; Take 1 tablet (37.5 mg) by mouth daily  Dispense: 90 tablet; Refill: 1    3. Dysmenorrhea  -discussed other options for contraception, the patient is interested in IUD, will schedule appointment with OB GYN     - levonorgestrel-ethinyl estradiol (FALMINA) 0.1-20 MG-MCG tablet; Take 1 tablet by mouth daily  Dispense: 84 tablet; Refill: 3  - OB/GYN REFERRAL    4. PMS (premenstrual syndrome)    - levonorgestrel-ethinyl estradiol (FALMINA) 0.1-20 MG-MCG tablet; Take 1 tablet by mouth daily  Dispense: 84 tablet; Refill: 3  - OB/GYN REFERRAL    5. Mild intermittent asthma without complication  -well controlled, refill provided   - albuterol (PROAIR HFA/PROVENTIL HFA/VENTOLIN HFA) 108 (90 Base) MCG/ACT inhaler; Inhale 2 puffs into the lungs every 6 hours  Dispense: 1 Inhaler; Refill: 5         See Patient Instructions    Return in about 6 months (around 2/14/2021) for Routine Visit.    KATELYN Blevins [facility]

## 2020-08-15 ASSESSMENT — ASTHMA QUESTIONNAIRES: ACT_TOTALSCORE: 20

## 2020-08-15 ASSESSMENT — ANXIETY QUESTIONNAIRES: GAD7 TOTAL SCORE: 4

## 2020-09-10 ENCOUNTER — MYC MEDICAL ADVICE (OUTPATIENT)
Dept: FAMILY MEDICINE | Facility: CLINIC | Age: 20
End: 2020-09-10

## 2020-09-10 NOTE — TELEPHONE ENCOUNTER
Routed to provider.  Please see MyChart message from pt.  She is asking that forms be completed for therapy dog?  Thank you.    Nohemy Mott RN

## 2020-10-27 ENCOUNTER — MYC MEDICAL ADVICE (OUTPATIENT)
Dept: FAMILY MEDICINE | Facility: CLINIC | Age: 20
End: 2020-10-27

## 2020-10-29 ENCOUNTER — E-VISIT (OUTPATIENT)
Dept: FAMILY MEDICINE | Facility: CLINIC | Age: 20
End: 2020-10-29
Payer: COMMERCIAL

## 2020-10-29 DIAGNOSIS — F41.1 GAD (GENERALIZED ANXIETY DISORDER): Primary | ICD-10-CM

## 2020-10-29 DIAGNOSIS — F32.1 CURRENT MODERATE EPISODE OF MAJOR DEPRESSIVE DISORDER WITHOUT PRIOR EPISODE (H): ICD-10-CM

## 2020-10-29 PROCEDURE — 99421 OL DIG E/M SVC 5-10 MIN: CPT | Performed by: NURSE PRACTITIONER

## 2020-10-29 RX ORDER — VENLAFAXINE HYDROCHLORIDE 75 MG/1
75 CAPSULE, EXTENDED RELEASE ORAL DAILY
Qty: 30 CAPSULE | Refills: 3 | Status: SHIPPED | OUTPATIENT
Start: 2020-10-29 | End: 2021-02-10

## 2020-10-29 ASSESSMENT — ANXIETY QUESTIONNAIRES
4. TROUBLE RELAXING: MORE THAN HALF THE DAYS
6. BECOMING EASILY ANNOYED OR IRRITABLE: MORE THAN HALF THE DAYS
GAD7 TOTAL SCORE: 13
2. NOT BEING ABLE TO STOP OR CONTROL WORRYING: MORE THAN HALF THE DAYS
7. FEELING AFRAID AS IF SOMETHING AWFUL MIGHT HAPPEN: SEVERAL DAYS
7. FEELING AFRAID AS IF SOMETHING AWFUL MIGHT HAPPEN: SEVERAL DAYS
GAD7 TOTAL SCORE: 13
5. BEING SO RESTLESS THAT IT IS HARD TO SIT STILL: MORE THAN HALF THE DAYS
GAD7 TOTAL SCORE: 13
3. WORRYING TOO MUCH ABOUT DIFFERENT THINGS: MORE THAN HALF THE DAYS
1. FEELING NERVOUS, ANXIOUS, OR ON EDGE: MORE THAN HALF THE DAYS

## 2020-10-29 ASSESSMENT — PATIENT HEALTH QUESTIONNAIRE - PHQ9
SUM OF ALL RESPONSES TO PHQ QUESTIONS 1-9: 12
SUM OF ALL RESPONSES TO PHQ QUESTIONS 1-9: 12
10. IF YOU CHECKED OFF ANY PROBLEMS, HOW DIFFICULT HAVE THESE PROBLEMS MADE IT FOR YOU TO DO YOUR WORK, TAKE CARE OF THINGS AT HOME, OR GET ALONG WITH OTHER PEOPLE: VERY DIFFICULT

## 2020-10-30 ASSESSMENT — PATIENT HEALTH QUESTIONNAIRE - PHQ9: SUM OF ALL RESPONSES TO PHQ QUESTIONS 1-9: 12

## 2020-10-30 ASSESSMENT — ANXIETY QUESTIONNAIRES: GAD7 TOTAL SCORE: 13

## 2020-11-16 ENCOUNTER — HEALTH MAINTENANCE LETTER (OUTPATIENT)
Age: 20
End: 2020-11-16

## 2020-11-21 ENCOUNTER — AMBULATORY - HEALTHEAST (OUTPATIENT)
Dept: FAMILY MEDICINE | Facility: CLINIC | Age: 20
End: 2020-11-21

## 2020-11-21 ENCOUNTER — VIRTUAL VISIT (OUTPATIENT)
Dept: FAMILY MEDICINE | Facility: OTHER | Age: 20
End: 2020-11-21
Payer: COMMERCIAL

## 2020-11-21 DIAGNOSIS — Z20.822 SUSPECTED 2019 NOVEL CORONAVIRUS INFECTION: ICD-10-CM

## 2020-11-21 PROCEDURE — 99421 OL DIG E/M SVC 5-10 MIN: CPT | Performed by: INTERNAL MEDICINE

## 2020-11-21 NOTE — PROGRESS NOTES
"Date: 2020 13:37:36  Clinician: Stefanie Draper  Clinician NPI: [id number]  Patient: Tiana Sumner  Patient : 2000  Patient Address: [address] [place] [id number]  Patient Phone: [phone]  Visit Protocol: URI  Patient Summary:  Tiana is a 20 year old ( : 2000 ) female who initiated a [facility] Visit for COVID-19 (Coronavirus) evaluation and screening. When asked the question \"Please sign me up to receive news, health information and promotions from [facility].\", Tiana responded \"Yes\".    Tiana states her symptoms started 1-2 days ago.   Her symptoms consist of rhinitis, myalgia, chills, malaise, a sore throat, diarrhea, a headache, enlarged lymph nodes, a cough, and nasal congestion. She is experiencing mild difficulty breathing with activities but can speak normally in full sentences. Tiana also feels feverish but was unable to measure her temperature.   Symptom details     Nasal secretions: The color of her mucus is yellow.    Cough: Tiana coughs every 5-10 minutes and her cough is more bothersome at night. Phlegm comes into her throat when she coughs. She does not believe her cough is caused by post-nasal drip. The color of the phlegm is yellow.     Sore throat: Tiana reports having mild throat pain (1-3 on a 10 point pain scale), does not have exudate on her tonsils, and can swallow liquids. The lymph nodes in her neck are enlarged. A rash has not appeared on the skin since the sore throat started.     Headache: She states the headache is severe (7-9 on a 10 point pain scale).      Tiana denies having vomiting, facial pain or pressure, teeth pain, ageusia, ear pain, wheezing, nausea, and anosmia. She also denies taking antibiotic medication in the past month, having recent facial or sinus surgery in the past 60 days, and having a sinus infection within the past year.   Precipitating events  Within the past week, Tiana has not been exposed to someone with strep " throat. She has not recently been exposed to someone with influenza. Tiana has been in close contact with the following high risk individuals: immunocompromised people, adults 65 or older, and people with asthma, heart disease or diabetes.   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 In the past 14 days, Tiana has worked or volunteered at a hospital or a clinic. 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   In the past 14 days, she has not lived in a congregate living setting.   Tiana has had a close contact with a laboratory-confirmed COVID-19 patient within 14 days of symptom onset. She was exposed at her work. She does not know when she was exposed to the laboratory-confirmed COVID-19 patient.   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 Many times I am exposed to patients that are not on precautions and later test positive and recently this did happen.    Since December 2019, Tiana has not been tested for COVID-19 and has not had upper respiratory infection or influenza-like illness.   Pertinent medical history  Tiana does not get yeast infections when she takes antibiotics.   Tiana does not need a return to work/school note.   Weight: 190 lbs   Tiana does not smoke or use smokeless tobacco.   She denies pregnancy and denies breastfeeding. She has menstruated in the past month.   Weight: 190 lbs    MEDICATIONS: albuterol sulfate inhalation, levonorgestrel-ethinyl estradiol oral, venlafaxine oral, ALLERGIES: NKDA  Clinician Response:  Dear Tiana,   Your symptoms show that you may have coronavirus (COVID-19). This  "illness can cause fever, cough and trouble breathing. Many people get a mild case and get better on their own. Some people can get very sick.  What should I do?  We would like to test you for this virus.   1. Please call [phone] to schedule your visit. Explain that you were referred by [facility] to have a COVID-19 test. Be ready to share [facility].  * If you need to schedule in [facility] please call [phone] or for [employer] employees please call [phone].  * If you need to schedule in the [place] area please call [phone]. [place] employees call [phone].  The following will serve as your written order for this COVID Test, ordered by me, for the indication of suspected COVID [Z20.828]: The test will be ordered in [employer], our electronic health record, after you are scheduled. It will show as ordered and authorized by Joseph Barrientos MD.  Order: COVID-19 (Coronavirus) PCR for SYMPTOMATIC testing from [facility].   2. When it's time for your COVID test:  Stay at least 6 feet away from others. (If someone will drive you to your test, stay in the backseat, as far away from the  as you can.)   Cover your mouth and nose with a mask, tissue or washcloth.  Go straight to the testing site. Don't make any stops on the way there or back.      3.Starting now: Stay home and away from others (self-isolate) until:   You've had no fever---and no medicine that reduces fever---for one full day (24 hours). And...   Your other symptoms have gotten better. For example, your cough or breathing has improved. And...   At least 10 days have passed since your symptoms started.       During this time, don't leave the house except for testing or medical care.   Stay in your own room, even for meals. Use your own bathroom if you can.   Stay away from others in your home. No hugging, kissing or shaking hands. No visitors.  Don't go to work, school or anywhere else.    Clean \"high touch\" surfaces often " (doorknobs, counters, handles, etc.). Use a household cleaning spray or wipes. You'll find a full list of  on the EPA website: www.epa.gov/pesticide-registration/list-n-disinfectants-use-against-sars-cov-2.   Cover your mouth and nose with a mask, tissue or washcloth to avoid spreading germs.  Wash your hands and face often. Use soap and water.  Caregivers in these groups are at risk for severe illness due to COVID-19:  o People 65 years and older  o People who live in a nursing home or long-term care facility  o People with chronic disease (lung, heart, cancer, diabetes, kidney, liver, immunologic)  o People who have a weakened immune system, including those who:   Are in cancer treatment  Take medicine that weakens the immune system, such as corticosteroids  Had a bone marrow or organ transplant  Have an immune deficiency  Have poorly controlled HIV or AIDS  Are obese (body mass index of 40 or higher)  Smoke regularly   o Caregivers should wear gloves while washing dishes, handling laundry and cleaning bedrooms and bathrooms.  o Use caution when washing and drying laundry: Don't shake dirty laundry, and use the warmest water setting that you can.  o For more tips, go to www.cdc.gov/coronavirus/2019-ncov/downloads/10Things.pdf.       How can I take care of myself?   Get lots of rest. Drink extra fluids (unless a doctor has told you not to).   Take Tylenol (acetaminophen) for fever or pain. If you have liver or kidney problems, ask your family doctor if it's okay to take Tylenol.   Adults can take either:    650 mg (two 325 mg pills) every 4 to 6 hours, or...   1,000 mg (two 500 mg pills) every 8 hours as needed.    Note: Don't take more than 3,000 mg in one day. Acetaminophen is found in many medicines (both prescribed and over-the-counter medicines). Read all labels to be sure you don't take too much.   For children, check the Tylenol bottle for the right dose. The dose is based on the child's age or  weight.    If you have other health problems (like cancer, heart failure, an organ transplant or severe kidney disease): Call your specialty clinic if you don't feel better in the next 2 days.       Know when to call 911. Emergency warning signs include:    Trouble breathing or shortness of breath Pain or pressure in the chest that doesn't go away Feeling confused like you haven't felt before, or not being able to wake up Bluish-colored lips or face.  Where can I get more information?   [facility] -- About COVID-19: [email or web address]   CDC -- What to Do If You're Sick: www.cdc.gov/coronavirus/2019-ncov/about/steps-when-sick.html   CDC -- Ending Home Isolation: www.cdc.gov/coronavirus/2019-ncov/hcp/disposition-in-home-patients.html   [facility] -- Caring for Someone: www.cdc.gov/coronavirus/2019-ncov/if-you-are-sick/care-for-someone.html   [facility] -- Interim Guidance for Hospital Discharge to Home: www.health.[facility].[place]/diseases/coronavirus/hcp/hospdischarge.pdf   [facility] clinical trials (COVID-19 research studies): clinicalaffairs.[facility]-clinical-trials    Below are the COVID-19 hotlines at the [place] Department of Health ([facility]). Interpreters are available.    For health questions: Call 466-312-4101 or 1-757.833.8146 (7 a.m. to 7 p.m.) For questions about schools and childcare: Call 531-241-6672 or 1-207.663.1463 (7 a.m. to 7 p.m.)    Diagnosis: Contact with and (suspected) exposure to other viral communicable diseases  Diagnosis ICD: Z20.828

## 2020-11-22 ENCOUNTER — AMBULATORY - HEALTHEAST (OUTPATIENT)
Dept: FAMILY MEDICINE | Facility: CLINIC | Age: 20
End: 2020-11-22

## 2020-11-22 ENCOUNTER — E-VISIT (OUTPATIENT)
Dept: FAMILY MEDICINE | Facility: CLINIC | Age: 20
End: 2020-11-22
Payer: COMMERCIAL

## 2020-11-22 DIAGNOSIS — J45.20 MILD INTERMITTENT ASTHMA WITHOUT COMPLICATION: ICD-10-CM

## 2020-11-22 DIAGNOSIS — Z20.822 EXPOSURE TO COVID-19 VIRUS: ICD-10-CM

## 2020-11-22 DIAGNOSIS — Z20.822 SUSPECTED COVID-19 VIRUS INFECTION: Primary | ICD-10-CM

## 2020-11-22 DIAGNOSIS — Z20.822 SUSPECTED 2019 NOVEL CORONAVIRUS INFECTION: ICD-10-CM

## 2020-11-22 PROCEDURE — 99421 OL DIG E/M SVC 5-10 MIN: CPT | Performed by: NURSE PRACTITIONER

## 2020-11-23 RX ORDER — BENZONATATE 100 MG/1
100 CAPSULE ORAL 3 TIMES DAILY PRN
Qty: 30 CAPSULE | Refills: 0 | Status: SHIPPED | OUTPATIENT
Start: 2020-11-23 | End: 2021-05-20

## 2020-11-23 RX ORDER — ALBUTEROL SULFATE 90 UG/1
2 AEROSOL, METERED RESPIRATORY (INHALATION) EVERY 6 HOURS
Qty: 1 INHALER | Refills: 5 | Status: SHIPPED | OUTPATIENT
Start: 2020-11-23

## 2020-11-23 RX ORDER — ALBUTEROL SULFATE 0.83 MG/ML
2.5 SOLUTION RESPIRATORY (INHALATION) EVERY 6 HOURS PRN
Qty: 2 BOX | Refills: 1 | Status: SHIPPED | OUTPATIENT
Start: 2020-11-23 | End: 2024-02-20

## 2020-11-24 ENCOUNTER — COMMUNICATION - HEALTHEAST (OUTPATIENT)
Dept: SCHEDULING | Facility: CLINIC | Age: 20
End: 2020-11-24

## 2021-02-08 DIAGNOSIS — F41.1 GAD (GENERALIZED ANXIETY DISORDER): ICD-10-CM

## 2021-02-08 DIAGNOSIS — F32.1 CURRENT MODERATE EPISODE OF MAJOR DEPRESSIVE DISORDER WITHOUT PRIOR EPISODE (H): ICD-10-CM

## 2021-02-08 NOTE — TELEPHONE ENCOUNTER
"Requested Prescriptions   Pending Prescriptions Disp Refills     venlafaxine (EFFEXOR-XR) 75 MG 24 hr capsule [Pharmacy Med Name: VENLAFAXINE HCL ER 75MG CP24] 30 capsule 3     Sig: TAKE ONE CAPSULE BY MOUTH ONCE DAILY       Serotonin-Norepinephrine Reuptake Inhibitors  Failed - 2/8/2021  5:03 AM        Failed - PHQ-9 score of less than 5 in past 6 months     Please review last PHQ-9 score.           Failed - Normal serum creatinine on file in past 12 months     Recent Labs   Lab Test 12/18/13  1542   CR 0.74*       Ok to refill medication if creatinine is low          Passed - Blood pressure under 140/90 in past 12 months     BP Readings from Last 3 Encounters:   08/14/20 122/82   08/07/19 108/66   06/05/19 121/80                 Passed - Medication is active on med list        Passed - Patient is age 18 or older        Passed - No active pregnancy on record        Passed - No positive pregnancy test in past 12 months        Passed - Recent (6 mo) or future (30 days) visit within the authorizing provider's specialty     Patient had office visit in the last 6 months or has a visit in the next 30 days with authorizing provider or within the authorizing provider's specialty.  See \"Patient Info\" tab in inbasket, or \"Choose Columns\" in Meds & Orders section of the refill encounter.                 "

## 2021-02-09 NOTE — TELEPHONE ENCOUNTER
PHQ 8/7/2019 8/14/2020 10/29/2020   PHQ-9 Total Score 3 2 12   Q9: Thoughts of better off dead/self-harm past 2 weeks Not at all Not at all Not at all

## 2021-02-10 RX ORDER — VENLAFAXINE HYDROCHLORIDE 75 MG/1
CAPSULE, EXTENDED RELEASE ORAL
Qty: 30 CAPSULE | Refills: 1 | Status: SHIPPED | OUTPATIENT
Start: 2021-02-10 | End: 2021-04-09

## 2021-04-08 DIAGNOSIS — F41.1 GAD (GENERALIZED ANXIETY DISORDER): ICD-10-CM

## 2021-04-08 DIAGNOSIS — F32.1 CURRENT MODERATE EPISODE OF MAJOR DEPRESSIVE DISORDER WITHOUT PRIOR EPISODE (H): ICD-10-CM

## 2021-04-09 RX ORDER — VENLAFAXINE HYDROCHLORIDE 75 MG/1
CAPSULE, EXTENDED RELEASE ORAL
Qty: 30 CAPSULE | Refills: 1 | Status: SHIPPED | OUTPATIENT
Start: 2021-04-09 | End: 2021-05-21

## 2021-04-09 NOTE — TELEPHONE ENCOUNTER
"  Requested Prescriptions   Pending Prescriptions Disp Refills     venlafaxine (EFFEXOR-XR) 75 MG 24 hr capsule [Pharmacy Med Name: VENLAFAXINE HCL ER 75MG CP24] 30 capsule 1     Sig: TAKE ONE CAPSULE BY MOUTH ONCE DAILY       Serotonin-Norepinephrine Reuptake Inhibitors  Failed - 4/8/2021  5:02 AM        Failed - PHQ-9 score of less than 5 in past 6 months     Please review last PHQ-9 score.           Failed - Normal serum creatinine on file in past 12 months     Recent Labs   Lab Test 12/18/13  1542   CR 0.74*       Ok to refill medication if creatinine is low          Failed - Recent (6 mo) or future (30 days) visit within the authorizing provider's specialty     Patient had office visit in the last 6 months or has a visit in the next 30 days with authorizing provider or within the authorizing provider's specialty.  See \"Patient Info\" tab in inbasket, or \"Choose Columns\" in Meds & Orders section of the refill encounter.            Passed - Blood pressure under 140/90 in past 12 months     BP Readings from Last 3 Encounters:   08/14/20 122/82   08/07/19 108/66   06/05/19 121/80                 Passed - Medication is active on med list        Passed - Patient is age 18 or older        Passed - No active pregnancy on record        Passed - No positive pregnancy test in past 12 months           PHQ 8/7/2019 8/14/2020 10/29/2020   PHQ-9 Total Score 3 2 12   Q9: Thoughts of better off dead/self-harm past 2 weeks Not at all Not at all Not at all   '  "

## 2021-05-20 ENCOUNTER — OFFICE VISIT (OUTPATIENT)
Dept: FAMILY MEDICINE | Facility: CLINIC | Age: 21
End: 2021-05-20
Payer: COMMERCIAL

## 2021-05-20 VITALS
HEART RATE: 73 BPM | TEMPERATURE: 98.6 F | OXYGEN SATURATION: 99 % | SYSTOLIC BLOOD PRESSURE: 118 MMHG | HEIGHT: 66 IN | BODY MASS INDEX: 30.84 KG/M2 | RESPIRATION RATE: 16 BRPM | WEIGHT: 191.9 LBS | DIASTOLIC BLOOD PRESSURE: 86 MMHG

## 2021-05-20 DIAGNOSIS — F32.1 CURRENT MODERATE EPISODE OF MAJOR DEPRESSIVE DISORDER WITHOUT PRIOR EPISODE (H): ICD-10-CM

## 2021-05-20 DIAGNOSIS — F41.1 GAD (GENERALIZED ANXIETY DISORDER): Primary | ICD-10-CM

## 2021-05-20 PROCEDURE — 99214 OFFICE O/P EST MOD 30 MIN: CPT | Performed by: NURSE PRACTITIONER

## 2021-05-20 PROCEDURE — 96127 BRIEF EMOTIONAL/BEHAV ASSMT: CPT | Performed by: NURSE PRACTITIONER

## 2021-05-20 RX ORDER — BUSPIRONE HYDROCHLORIDE 5 MG/1
5 TABLET ORAL 2 TIMES DAILY
Qty: 60 TABLET | Refills: 3 | Status: SHIPPED | OUTPATIENT
Start: 2021-05-20 | End: 2021-08-12

## 2021-05-20 RX ORDER — HYDROXYZINE PAMOATE 25 MG/1
25 CAPSULE ORAL 3 TIMES DAILY PRN
Qty: 30 CAPSULE | Refills: 3 | Status: SHIPPED | OUTPATIENT
Start: 2021-05-20 | End: 2021-08-12

## 2021-05-20 ASSESSMENT — ANXIETY QUESTIONNAIRES
3. WORRYING TOO MUCH ABOUT DIFFERENT THINGS: NEARLY EVERY DAY
7. FEELING AFRAID AS IF SOMETHING AWFUL MIGHT HAPPEN: MORE THAN HALF THE DAYS
GAD7 TOTAL SCORE: 19
IF YOU CHECKED OFF ANY PROBLEMS ON THIS QUESTIONNAIRE, HOW DIFFICULT HAVE THESE PROBLEMS MADE IT FOR YOU TO DO YOUR WORK, TAKE CARE OF THINGS AT HOME, OR GET ALONG WITH OTHER PEOPLE: EXTREMELY DIFFICULT
2. NOT BEING ABLE TO STOP OR CONTROL WORRYING: NEARLY EVERY DAY
5. BEING SO RESTLESS THAT IT IS HARD TO SIT STILL: NEARLY EVERY DAY
6. BECOMING EASILY ANNOYED OR IRRITABLE: MORE THAN HALF THE DAYS
1. FEELING NERVOUS, ANXIOUS, OR ON EDGE: NEARLY EVERY DAY

## 2021-05-20 ASSESSMENT — MIFFLIN-ST. JEOR: SCORE: 1647.2

## 2021-05-20 ASSESSMENT — PATIENT HEALTH QUESTIONNAIRE - PHQ9
SUM OF ALL RESPONSES TO PHQ QUESTIONS 1-9: 10
5. POOR APPETITE OR OVEREATING: NEARLY EVERY DAY

## 2021-05-20 NOTE — PATIENT INSTRUCTIONS
Start Buspar 5 mg twice daily for anxiety  Vistaril 25 mg can take up to 3 times daily as needed for anxiety and sleep     Continue Effexor for now    Let me know if no improvement in 4-6 weeks

## 2021-05-20 NOTE — PROGRESS NOTES
"    Assessment & Plan     JAZMIN (generalized anxiety disorder)  -continue Effexor 75 mg daily AM, start Buspar and Vistaril for as needed use   - hydrOXYzine (VISTARIL) 25 MG capsule; Take 1 capsule (25 mg) by mouth 3 times daily as needed for anxiety  - busPIRone (BUSPAR) 5 MG tablet; Take 1 tablet (5 mg) by mouth 2 times daily      KATELYN Blevins CNP  [facility]iel is a 21 year old who presents for the following health issues     Chief Complaint   Patient presents with     Depression     Effexor has not been working well      Anxiety         HPI     Depression and Anxiety Follow-Up    How are you doing with your depression since your last visit? No change    How are you doing with your anxiety since your last visit?  Worsened- is not sure why     Are you having other symptoms that might be associated with depression or anxiety? No    Have you had a significant life event? No     Do you have any concerns with your use of alcohol or other drugs? No    Social History     Tobacco Use     Smoking status: Never Smoker     Smokeless tobacco: Never Used   Substance Use Topics     Alcohol use: No     Frequency: Never     Drug use: No     PHQ 8/14/2020 10/29/2020 5/20/2021   PHQ-9 Total Score 2 12 10   Q9: Thoughts of better off dead/self-harm past 2 weeks Not at all Not at all Not at all     JAZMIN-7 SCORE 8/14/2020 10/29/2020 5/20/2021   Total Score - 13 (moderate anxiety) -   Total Score 4 13 19         Review of Systems   Constitutional, HEENT, cardiovascular, pulmonary, gi and gu systems are negative, except as otherwise noted.      Objective    /86 (BP Location: Right arm, Patient Position: Sitting, Cuff Size: Adult Large)   Pulse 73   Temp 98.6  F (37  C) (Tympanic)   Resp 16   Ht 1.676 m (5' 6\")   Wt 87 kg (191 lb 14.4 oz)   SpO2 99%   BMI 30.97 kg/m    Body mass index is 30.97 kg/m .  Physical Exam   GENERAL: healthy, alert and no distress  PSYCH: " mentation appears normal, affect normal/bright

## 2021-05-21 RX ORDER — VENLAFAXINE HYDROCHLORIDE 75 MG/1
CAPSULE, EXTENDED RELEASE ORAL
Qty: 30 CAPSULE | Refills: 1 | Status: SHIPPED | OUTPATIENT
Start: 2021-05-21 | End: 2021-07-29

## 2021-05-21 ASSESSMENT — ANXIETY QUESTIONNAIRES: GAD7 TOTAL SCORE: 19

## 2021-05-21 ASSESSMENT — ASTHMA QUESTIONNAIRES: ACT_TOTALSCORE: 25

## 2021-06-17 ENCOUNTER — OFFICE VISIT (OUTPATIENT)
Dept: OBGYN | Facility: CLINIC | Age: 21
End: 2021-06-17
Payer: COMMERCIAL

## 2021-06-17 VITALS
SYSTOLIC BLOOD PRESSURE: 131 MMHG | DIASTOLIC BLOOD PRESSURE: 80 MMHG | HEART RATE: 88 BPM | RESPIRATION RATE: 16 BRPM | HEIGHT: 66 IN | BODY MASS INDEX: 30.76 KG/M2 | WEIGHT: 191.4 LBS

## 2021-06-17 DIAGNOSIS — Z30.430 ENCOUNTER FOR INSERTION OF INTRAUTERINE CONTRACEPTIVE DEVICE: ICD-10-CM

## 2021-06-17 DIAGNOSIS — Z01.419 ENCOUNTER FOR GYNECOLOGICAL EXAMINATION WITHOUT ABNORMAL FINDING: Primary | ICD-10-CM

## 2021-06-17 DIAGNOSIS — Z30.430 ENCOUNTER FOR INSERTION OF MIRENA IUD: ICD-10-CM

## 2021-06-17 PROCEDURE — 58300 INSERT INTRAUTERINE DEVICE: CPT | Performed by: OBSTETRICS & GYNECOLOGY

## 2021-06-17 PROCEDURE — 87591 N.GONORRHOEAE DNA AMP PROB: CPT | Performed by: OBSTETRICS & GYNECOLOGY

## 2021-06-17 PROCEDURE — 99203 OFFICE O/P NEW LOW 30 MIN: CPT | Mod: 25 | Performed by: OBSTETRICS & GYNECOLOGY

## 2021-06-17 PROCEDURE — G0145 SCR C/V CYTO,THINLAYER,RESCR: HCPCS | Performed by: OBSTETRICS & GYNECOLOGY

## 2021-06-17 PROCEDURE — 87491 CHLMYD TRACH DNA AMP PROBE: CPT | Performed by: OBSTETRICS & GYNECOLOGY

## 2021-06-17 ASSESSMENT — MIFFLIN-ST. JEOR: SCORE: 1644.93

## 2021-06-17 NOTE — PROGRESS NOTES
[facility]  OB/GYN Clinic   Gynecology Consult Note    CC:   Chief Complaint   Patient presents with     Consult     heavy cycles, cramping     Gyn Exam     pap/ gc/chlam only       HPI: Ms. Sumner is a 21 year old G0 who presents for consultation for abnormal uterine bleeding and dysmenorrhea. Has had this issue since menarche at age 11, previously well-controlled on COCPs, but for the past two years her periods are getting heavier and more painful. No issues with dyspareunia or pain with intercourse.   No issues with missing pills.     GYN Hx: No STI or pregnancy hx. Never has had a pelvic exam or PAP smear.     ROS: A 10 pt ROS was completed and found to be otherwise negative unless mentioned in the HPI.     PMH:   Past Medical History:   Diagnosis Date     Acute upper respiratory infections of unspecified site      Hypertrophy of tonsil with adenoids      Other diseases of trachea and bronchus, not elsewhere classified      Routine infant or child health check      Unspecified otitis media        PSHx:   Past Surgical History:   Procedure Laterality Date     SURGICAL HISTORY OF -   11/13/2002    Tonsillectomy and adenoidectomy       OBHx:   OB History   No obstetric history on file.       Medications:   busPIRone (BUSPAR) 5 MG tablet, Take 1 tablet (5 mg) by mouth 2 times daily  hydrOXYzine (VISTARIL) 25 MG capsule, Take 1 capsule (25 mg) by mouth 3 times daily as needed for anxiety  levonorgestrel-ethinyl estradiol (FALMINA) 0.1-20 MG-MCG tablet, Take 1 tablet by mouth daily  venlafaxine (EFFEXOR-XR) 75 MG 24 hr capsule, TAKE ONE CAPSULE BY MOUTH ONCE DAILY  albuterol (PROAIR HFA/PROVENTIL HFA/VENTOLIN HFA) 108 (90 Base) MCG/ACT inhaler, Inhale 2 puffs into the lungs every 6 hours (Patient not taking: Reported on 5/20/2021)  albuterol (PROVENTIL) (2.5 MG/3ML) 0.083% neb solution, Take 1 vial (2.5 mg) by nebulization every 6 hours as needed for shortness of breath / dyspnea or wheezing  (Patient not taking: Reported on 5/20/2021)    No current facility-administered medications on file prior to visit.       Allergies:    No Known Allergies    Social History:   Social History     Socioeconomic History     Marital status: Single     Spouse name: Not on file     Number of children: Not on file     Years of education: Not on file     Highest education level: Not on file   Occupational History     Not on file   Social Needs     Financial resource strain: Not on file     Food insecurity     Worry: Not on file     Inability: Not on file     Transportation needs     Medical: Not on file     Non-medical: Not on file   Tobacco Use     Smoking status: Never Smoker     Smokeless tobacco: Never Used   Substance and Sexual Activity     Alcohol use: Yes     Frequency: Never     Comment: occ     Drug use: No     Sexual activity: Yes     Partners: Male     Birth control/protection: Pill   Lifestyle     Physical activity     Days per week: Not on file     Minutes per session: Not on file     Stress: Not on file   Relationships     Social connections     Talks on phone: Not on file     Gets together: Not on file     Attends Anabaptism service: Not on file     Active member of club or organization: Not on file     Attends meetings of clubs or organizations: Not on file     Relationship status: Not on file     Intimate partner violence     Fear of current or ex partner: Not on file     Emotionally abused: Not on file     Physically abused: Not on file     Forced sexual activity: Not on file   Other Topics Concern     Parent/sibling w/ CABG, MI or angioplasty before 65F 55M? Not Asked   Social History Narrative     Not on file     Social History     Socioeconomic History     Marital status: Single     Spouse name: None     Number of children: None     Years of education: None     Highest education level: None   Occupational History     None   Social Needs     Financial resource strain: None     Food insecurity     Worry:  "None     Inability: None     Transportation needs     Medical: None     Non-medical: None   Tobacco Use     Smoking status: Never Smoker     Smokeless tobacco: Never Used   Substance and Sexual Activity     Alcohol use: Yes     Frequency: Never     Comment: occ     Drug use: No     Sexual activity: Yes     Partners: Male     Birth control/protection: Pill   Lifestyle     Physical activity     Days per week: None     Minutes per session: None     Stress: None   Relationships     Social connections     Talks on phone: None     Gets together: None     Attends Orthodox service: None     Active member of club or organization: None     Attends meetings of clubs or organizations: None     Relationship status: None     Intimate partner violence     Fear of current or ex partner: None     Emotionally abused: None     Physically abused: None     Forced sexual activity: None   Other Topics Concern     Parent/sibling w/ CABG, MI or angioplasty before 65F 55M? Not Asked   Social History Narrative     None       Family History:   Family History   Problem Relation Age of Onset     Asthma No family hx of      C.A.D. No family hx of      Diabetes No family hx of      Hypertension No family hx of      Cerebrovascular Disease No family hx of      Breast Cancer No family hx of    Mom had hysterectomy for abnormal uterine bleeding.   Denies any GYN malignancies or endometriosis hx.     Physical Exam:   Vitals:    06/17/21 1454   BP: 131/80   BP Location: Right arm   Patient Position: Chair   Cuff Size: Adult Regular   Pulse: 88   Resp: 16   Weight: 86.8 kg (191 lb 6.4 oz)   Height: 1.676 m (5' 6\")      Estimated body mass index is 30.89 kg/m  as calculated from the following:    Height as of this encounter: 1.676 m (5' 6\").    Weight as of this encounter: 86.8 kg (191 lb 6.4 oz).    Gen: Pleasant, talkative female in no apparent distress   Respiratory: breathing comfortably on room air   Cardiac: Regular rate, warm and well-perfused. "   GI: Abd soft and non-tender  : External genitalia is free of lesion. Urethra and bartholin glands normal.  Vaginal mucosa is moist and pink without unusual discharge.  Cervix is without lesions or discharge.  Pap smear and endocervical sampling obtained without incident.  Rectal: no masses or hemorrhoids visually appreciated  Derm: no acanthosis nigricans, ance or facial/back/abdominal hair growth patterns   MSK: Grossly normal movement of all four extremities  Psych: mood and affect bright   Lower extremity: edema not present     A&P: 21 year old yo Go who presents to discuss abnormal uterine bleeding and dysmenorrhea.   Cycles appear ovulatory and regular, just heavy and painful. Has failed management with low-dose OCPs.     Discussed alternative treatments including the Mirena IUD and the Nexplanon. Discussed placement, expected bleeding profiles, side effect profile, efficacy (as high as sterilization) and reversibility. Discussed protection from endometrial cancer and hyperplasia with progesterone-containing IUD. Discussed the remaining options including depo provera, OCPs, hormonal patch and hormonal ring including side effect profile and bleeding patterns. Discussed higher dose OCPs for better cycles control. Discussed option of lysteda for reduction in blood loss. Also discussed option of laparoscopy to evaluation for endometriosis.   After this discussion, would like Mirena IUD.   If fails this, would plan for further endocrinopathy work-up and pelvic US.     IUD Placement Procedure Note    Tiana Sumner  2000  [id number]    The patient was counseled on the risks (including risk of bleeding, infection, uterine perforation, cramping), benefits (high efficacy, low maintenance birth control, reduced risk of uterine cancer and hyperplasia, improvement in menstrual bleeding), and alternatives of the procedure. Verbal and written consent were obtained.     Technique: The patient was placed in the  dorsal lithotomy position.  A speculum was placed in the vagina and the cervix was cleaned with betadine swabsx3. The anterior cervical lip was grasped with a tenaculum. The Mirena IUD was loaded, advanced to the fundus, pulled back 1cm, deployed and advanced to the fundus. Using the insertor as a sound, the uterus sounded to 7 cm. IUD strings were cut 3cm from the external cervical os. The patient tolerated the procedure well and there were no complications. EBL: 0cc.     Discussed option of returning to clinic for a string check vs self-monthly checks.     Health Maintenance: PAP smear and GC/Chlam collected, declined further STI screening.     Lily Garay MD  OB/GYN

## 2021-06-17 NOTE — NURSING NOTE
"Initial /80 (BP Location: Right arm, Patient Position: Chair, Cuff Size: Adult Regular)   Pulse 88   Resp 16   Ht 1.676 m (5' 6\")   Wt 86.8 kg (191 lb 6.4 oz)   LMP 06/08/2021   Breastfeeding No   BMI 30.89 kg/m   Estimated body mass index is 30.89 kg/m  as calculated from the following:    Height as of this encounter: 1.676 m (5' 6\").    Weight as of this encounter: 86.8 kg (191 lb 6.4 oz). .    Krya Silverman, MAURICIO    "

## 2021-06-18 LAB
C TRACH DNA SPEC QL NAA+PROBE: NEGATIVE
N GONORRHOEA DNA SPEC QL NAA+PROBE: NEGATIVE
SPECIMEN SOURCE: NORMAL
SPECIMEN SOURCE: NORMAL

## 2021-06-21 LAB
COPATH REPORT: NORMAL
PAP: NORMAL

## 2021-07-10 DIAGNOSIS — N94.3 PMS (PREMENSTRUAL SYNDROME): ICD-10-CM

## 2021-07-10 DIAGNOSIS — N94.6 DYSMENORRHEA: ICD-10-CM

## 2021-07-14 RX ORDER — LEVONORGESTREL/ETHIN.ESTRADIOL 0.1-0.02MG
TABLET ORAL
Qty: 84 TABLET | Refills: 0 | Status: SHIPPED | OUTPATIENT
Start: 2021-07-14 | End: 2023-01-20

## 2021-07-28 DIAGNOSIS — F32.1 CURRENT MODERATE EPISODE OF MAJOR DEPRESSIVE DISORDER WITHOUT PRIOR EPISODE (H): ICD-10-CM

## 2021-07-28 DIAGNOSIS — F41.1 GAD (GENERALIZED ANXIETY DISORDER): ICD-10-CM

## 2021-07-29 RX ORDER — VENLAFAXINE HYDROCHLORIDE 75 MG/1
CAPSULE, EXTENDED RELEASE ORAL
Qty: 30 CAPSULE | Refills: 1 | Status: SHIPPED | OUTPATIENT
Start: 2021-07-29 | End: 2021-08-12

## 2021-07-29 NOTE — TELEPHONE ENCOUNTER
"Requested Prescriptions   Pending Prescriptions Disp Refills     venlafaxine (EFFEXOR-XR) 75 MG 24 hr capsule [Pharmacy Med Name: VENLAFAXINE HCL ER 75MG CP24] 30 capsule 1     Sig: TAKE ONE CAPSULE BY MOUTH ONCE DAILY       Serotonin-Norepinephrine Reuptake Inhibitors  Failed - 7/28/2021  5:01 AM        Failed - PHQ-9 score of less than 5 in past 6 months     Please review last PHQ-9 score.           Failed - Normal serum creatinine on file in past 12 months     Recent Labs   Lab Test 12/18/13  1542   CR 0.74*       Ok to refill medication if creatinine is low          Passed - Blood pressure under 140/90 in past 12 months     BP Readings from Last 3 Encounters:   06/17/21 131/80   05/20/21 118/86   08/14/20 122/82                 Passed - Medication is active on med list        Passed - Patient is age 18 or older        Passed - No active pregnancy on record        Passed - No positive pregnancy test in past 12 months        Passed - Recent (6 mo) or future (30 days) visit within the authorizing provider's specialty     Patient had office visit in the last 6 months or has a visit in the next 30 days with authorizing provider or within the authorizing provider's specialty.  See \"Patient Info\" tab in inbasket, or \"Choose Columns\" in Meds & Orders section of the refill encounter.                 "

## 2021-08-12 ENCOUNTER — OFFICE VISIT (OUTPATIENT)
Dept: FAMILY MEDICINE | Facility: CLINIC | Age: 21
End: 2021-08-12
Payer: COMMERCIAL

## 2021-08-12 VITALS
OXYGEN SATURATION: 98 % | BODY MASS INDEX: 29.83 KG/M2 | SYSTOLIC BLOOD PRESSURE: 112 MMHG | TEMPERATURE: 98 F | RESPIRATION RATE: 22 BRPM | HEART RATE: 65 BPM | WEIGHT: 185.6 LBS | HEIGHT: 66 IN | DIASTOLIC BLOOD PRESSURE: 78 MMHG

## 2021-08-12 DIAGNOSIS — F32.1 CURRENT MODERATE EPISODE OF MAJOR DEPRESSIVE DISORDER WITHOUT PRIOR EPISODE (H): ICD-10-CM

## 2021-08-12 DIAGNOSIS — F41.1 GAD (GENERALIZED ANXIETY DISORDER): Primary | ICD-10-CM

## 2021-08-12 PROCEDURE — 99213 OFFICE O/P EST LOW 20 MIN: CPT | Performed by: NURSE PRACTITIONER

## 2021-08-12 RX ORDER — LORAZEPAM 0.5 MG/1
0.5 TABLET ORAL DAILY PRN
Qty: 15 TABLET | Refills: 1 | Status: SHIPPED | OUTPATIENT
Start: 2021-08-12 | End: 2022-04-08

## 2021-08-12 RX ORDER — VENLAFAXINE HYDROCHLORIDE 150 MG/1
150 CAPSULE, EXTENDED RELEASE ORAL DAILY
Qty: 90 CAPSULE | Refills: 1 | Status: SHIPPED | OUTPATIENT
Start: 2021-08-12 | End: 2022-01-30

## 2021-08-12 RX ORDER — HYDROXYZINE PAMOATE 50 MG/1
50 CAPSULE ORAL AT BEDTIME
Qty: 60 CAPSULE | Refills: 5 | Status: SHIPPED | OUTPATIENT
Start: 2021-08-12 | End: 2022-08-01

## 2021-08-12 ASSESSMENT — ANXIETY QUESTIONNAIRES
IF YOU CHECKED OFF ANY PROBLEMS ON THIS QUESTIONNAIRE, HOW DIFFICULT HAVE THESE PROBLEMS MADE IT FOR YOU TO DO YOUR WORK, TAKE CARE OF THINGS AT HOME, OR GET ALONG WITH OTHER PEOPLE: EXTREMELY DIFFICULT
3. WORRYING TOO MUCH ABOUT DIFFERENT THINGS: NEARLY EVERY DAY
2. NOT BEING ABLE TO STOP OR CONTROL WORRYING: NEARLY EVERY DAY
7. FEELING AFRAID AS IF SOMETHING AWFUL MIGHT HAPPEN: NEARLY EVERY DAY
5. BEING SO RESTLESS THAT IT IS HARD TO SIT STILL: NEARLY EVERY DAY
1. FEELING NERVOUS, ANXIOUS, OR ON EDGE: NEARLY EVERY DAY
6. BECOMING EASILY ANNOYED OR IRRITABLE: MORE THAN HALF THE DAYS
GAD7 TOTAL SCORE: 20

## 2021-08-12 ASSESSMENT — PATIENT HEALTH QUESTIONNAIRE - PHQ9
SUM OF ALL RESPONSES TO PHQ QUESTIONS 1-9: 16
5. POOR APPETITE OR OVEREATING: NEARLY EVERY DAY

## 2021-08-12 ASSESSMENT — MIFFLIN-ST. JEOR: SCORE: 1618.63

## 2021-08-12 NOTE — PATIENT INSTRUCTIONS
Increase Effexor to 150 mg daily    Vistaril 50 mg at bedtime    Melatonin 5-10 mg daily     Ativan 0.5 mg daily as needed for panic attacks, avoid daily use

## 2021-08-12 NOTE — PROGRESS NOTES
Assessment & Plan     JAZMIN (generalized anxiety disorder)  -uncontrolled  -recommended follow up with [facility] for counseling therapy   -recommended to increase Effexor to 150 mg daily, continue Vistaril 50 mg at bedtime   - venlafaxine (EFFEXOR-XR) 150 MG 24 hr capsule; Take 1 capsule (150 mg) by mouth daily  - hydrOXYzine (VISTARIL) 50 MG capsule; Take 1 capsule (50 mg) by mouth At Bedtime  - LORazepam (ATIVAN) 0.5 MG tablet; Take 1 tablet (0.5 mg) by mouth daily as needed for anxiety    Current moderate episode of major depressive disorder without prior episode (H)    - venlafaxine (EFFEXOR-XR) 150 MG 24 hr capsule; Take 1 capsule (150 mg) by mouth daily      KATELYN Blevins [facility]    Christa Montiel is a 21 year old who presents for the following health issues  accompanied by her mother:    HPI     Depression and Anxiety Follow-Up    How are you doing with your depression since your last visit? Worsened.  Patient feels her symptoms have worsened since starting the Buspar and Visatril.    How are you doing with your anxiety since your last visit?  Worsened.  Patient feels her symptoms have worsened since starting the Buspar and Visatril.    Are you having other symptoms that might be associated with depression or anxiety? No    Have you had a significant life event? No     Do you have any concerns with your use of alcohol or other drugs? No    Social History     Tobacco Use     Smoking status: Never Smoker     Smokeless tobacco: Never Used   Vaping Use     Vaping Use: Never used   Substance Use Topics     Alcohol use: Yes     Comment: occ     Drug use: No     PHQ 8/14/2020 10/29/2020 5/20/2021   PHQ-9 Total Score 2 12 10   Q9: Thoughts of better off dead/self-harm past 2 weeks Not at all Not at all Not at all     JAZMIN-7 SCORE 8/14/2020 10/29/2020 5/20/2021   Total Score - 13 (moderate anxiety) -   Total Score 4 13 19     Last PHQ-9 5/20/2021   1.  Little interest or  "pleasure in doing things 0   2.  Feeling down, depressed, or hopeless 0   3.  Trouble falling or staying asleep, or sleeping too much 3   4.  Feeling tired or having little energy 2   5.  Poor appetite or overeating 2   6.  Feeling bad about yourself 0   7.  Trouble concentrating 1   8.  Moving slowly or restless 2   Q9: Thoughts of better off dead/self-harm past 2 weeks 0   PHQ-9 Total Score 10   Difficulty at work, home, or with people Very difficult     JAZMIN-7  5/20/2021   1. Feeling nervous, anxious, or on edge 3   2. Not being able to stop or control worrying 3   3. Worrying too much about different things 3   4. Trouble relaxing 3   5. Being so restless that it is hard to sit still 3   6. Becoming easily annoyed or irritable 2   7. Feeling afraid, as if something awful might happen 2   JAZMIN-7 Total Score 19   If you checked any problems, how difficult have they made it for you to do your work, take care of things at home, or get along with other people? Extremely difficult           Review of Systems   Constitutional, HEENT, cardiovascular, pulmonary, gi and gu systems are negative, except as otherwise noted.      Objective    /78 (BP Location: Left arm, Patient Position: Sitting, Cuff Size: Adult Regular)   Pulse 65   Temp 98  F (36.7  C)   Resp 22   Ht 1.676 m (5' 6\")   Wt 84.2 kg (185 lb 9.6 oz)   SpO2 98%   BMI 29.96 kg/m    Body mass index is 29.96 kg/m .  Physical Exam   GENERAL: healthy, alert and no distress  EYES: Eyes grossly normal to inspection, PERRL and conjunctivae and sclerae normal  NEURO: Normal strength and tone, mentation intact and speech normal  PSYCH: mentation appears normal, affect normal/bright and anxious              "

## 2021-08-13 ASSESSMENT — ANXIETY QUESTIONNAIRES: GAD7 TOTAL SCORE: 20

## 2021-09-05 DIAGNOSIS — F41.1 GAD (GENERALIZED ANXIETY DISORDER): ICD-10-CM

## 2021-09-07 RX ORDER — BUSPIRONE HYDROCHLORIDE 5 MG/1
TABLET ORAL
Qty: 60 TABLET | Refills: 3 | OUTPATIENT
Start: 2021-09-07

## 2021-09-18 ENCOUNTER — HEALTH MAINTENANCE LETTER (OUTPATIENT)
Age: 21
End: 2021-09-18

## 2021-10-19 PROBLEM — F32.9 MAJOR DEPRESSION: Status: ACTIVE | Noted: 2018-06-18

## 2021-12-30 ENCOUNTER — MYC MEDICAL ADVICE (OUTPATIENT)
Dept: FAMILY MEDICINE | Facility: CLINIC | Age: 21
End: 2021-12-30
Payer: COMMERCIAL
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
## 2022-01-05 ENCOUNTER — OFFICE VISIT (OUTPATIENT)
Dept: FAMILY MEDICINE | Facility: CLINIC | Age: 22
End: 2022-01-05
Payer: COMMERCIAL

## 2022-01-05 VITALS
DIASTOLIC BLOOD PRESSURE: 80 MMHG | TEMPERATURE: 98.3 F | OXYGEN SATURATION: 98 % | BODY MASS INDEX: 28.92 KG/M2 | SYSTOLIC BLOOD PRESSURE: 122 MMHG | WEIGHT: 179.2 LBS | HEART RATE: 77 BPM

## 2022-01-05 DIAGNOSIS — M54.2 NECK PAIN: ICD-10-CM

## 2022-01-05 DIAGNOSIS — G44.209 TENSION HEADACHE: ICD-10-CM
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
## 2022-01-05 PROCEDURE — 99214 OFFICE O/P EST MOD 30 MIN: CPT | Performed by: NURSE PRACTITIONER

## 2022-01-05 RX ORDER — GABAPENTIN 100 MG/1
100 CAPSULE ORAL 3 TIMES DAILY
Qty: 90 CAPSULE | Refills: 1 | Status: SHIPPED | OUTPATIENT
Start: 2022-01-05 | End: 2022-06-09

## 2022-01-05 RX ORDER — NORTRIPTYLINE HCL 10 MG
10 CAPSULE ORAL AT BEDTIME
Qty: 30 CAPSULE | Refills: 2 | Status: SHIPPED | OUTPATIENT
Start: 2022-01-05 | End: 2022-06-09

## 2022-01-05 NOTE — PATIENT INSTRUCTIONS
Recommend to try Pamelor 10 mg daily at bedtime    Gabapentin 100 mg 3 times daily for about 1-2 months, should help with neck and back pain
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
## 2022-01-08 ENCOUNTER — HEALTH MAINTENANCE LETTER (OUTPATIENT)
Age: 22
End: 2022-01-08

## 2022-01-28 DIAGNOSIS — F32.1 CURRENT MODERATE EPISODE OF MAJOR DEPRESSIVE DISORDER WITHOUT PRIOR EPISODE (H): ICD-10-CM

## 2022-01-28 DIAGNOSIS — F41.1 GAD (GENERALIZED ANXIETY DISORDER): ICD-10-CM

## 2022-01-30 RX ORDER — VENLAFAXINE HYDROCHLORIDE 150 MG/1
150 CAPSULE, EXTENDED RELEASE ORAL DAILY
Qty: 90 CAPSULE | Refills: 0 | Status: SHIPPED | OUTPATIENT
Start: 2022-01-30 | End: 2022-04-25

## 2022-04-08 DIAGNOSIS — F41.1 GAD (GENERALIZED ANXIETY DISORDER): ICD-10-CM

## 2022-04-08 RX ORDER — LORAZEPAM 0.5 MG/1
0.5 TABLET ORAL DAILY PRN
Qty: 15 TABLET | Refills: 1 | Status: SHIPPED | OUTPATIENT
Start: 2022-04-08 | End: 2023-01-20

## 2022-04-08 NOTE — TELEPHONE ENCOUNTER
Routing refill request to provider for review/approval because:  Drug not on the [facility] refill protocol     Pending Prescriptions:                       Disp   Refills    LORazepam (ATIVAN) 0.5 MG tablet [Pharmacy*15 tab*1        Sig: Take 1 tablet (0.5 mg) by mouth daily as needed for           anxiety    Shima Edge RN on 4/8/2022 at 9:53 AM

## 2022-04-23 DIAGNOSIS — F32.1 CURRENT MODERATE EPISODE OF MAJOR DEPRESSIVE DISORDER WITHOUT PRIOR EPISODE (H): ICD-10-CM

## 2022-04-23 DIAGNOSIS — F41.1 GAD (GENERALIZED ANXIETY DISORDER): ICD-10-CM

## 2022-04-25 RX ORDER — VENLAFAXINE HYDROCHLORIDE 150 MG/1
150 CAPSULE, EXTENDED RELEASE ORAL DAILY
Qty: 90 CAPSULE | Refills: 0 | Status: SHIPPED | OUTPATIENT
Start: 2022-04-25 | End: 2022-07-14

## 2022-04-25 NOTE — TELEPHONE ENCOUNTER
Routing refill request to provider for review/approval because:  Patient needs a PHQ 9 and labs.  I will message her.

## 2022-06-06 ENCOUNTER — MYC MEDICAL ADVICE (OUTPATIENT)
Dept: FAMILY MEDICINE | Facility: CLINIC | Age: 22
End: 2022-06-06
Payer: COMMERCIAL

## 2022-06-09 ENCOUNTER — VIRTUAL VISIT (OUTPATIENT)
Dept: FAMILY MEDICINE | Facility: CLINIC | Age: 22
End: 2022-06-09
Payer: COMMERCIAL

## 2022-06-09 DIAGNOSIS — U07.1 INFECTION DUE TO 2019 NOVEL CORONAVIRUS: Primary | ICD-10-CM

## 2022-06-09 PROCEDURE — 99213 OFFICE O/P EST LOW 20 MIN: CPT | Mod: CS | Performed by: NURSE PRACTITIONER

## 2022-06-09 RX ORDER — BENZONATATE 100 MG/1
100 CAPSULE ORAL 3 TIMES DAILY PRN
Qty: 21 CAPSULE | Refills: 0 | Status: SHIPPED | OUTPATIENT
Start: 2022-06-09 | End: 2023-01-20

## 2022-06-09 RX ORDER — PREDNISONE 20 MG/1
20 TABLET ORAL 2 TIMES DAILY
Qty: 10 TABLET | Refills: 0 | Status: SHIPPED | OUTPATIENT
Start: 2022-06-09 | End: 2022-06-14

## 2022-06-09 ASSESSMENT — ASTHMA QUESTIONNAIRES
ACT_TOTALSCORE: 21
ACT_TOTALSCORE: 21
QUESTION_3 LAST FOUR WEEKS HOW OFTEN DID YOUR ASTHMA SYMPTOMS (WHEEZING, COUGHING, SHORTNESS OF BREATH, CHEST TIGHTNESS OR PAIN) WAKE YOU UP AT NIGHT OR EARLIER THAN USUAL IN THE MORNING: ONCE OR TWICE
QUESTION_5 LAST FOUR WEEKS HOW WOULD YOU RATE YOUR ASTHMA CONTROL: WELL CONTROLLED
QUESTION_4 LAST FOUR WEEKS HOW OFTEN HAVE YOU USED YOUR RESCUE INHALER OR NEBULIZER MEDICATION (SUCH AS ALBUTEROL): ONCE A WEEK OR LESS
QUESTION_1 LAST FOUR WEEKS HOW MUCH OF THE TIME DID YOUR ASTHMA KEEP YOU FROM GETTING AS MUCH DONE AT WORK, SCHOOL OR AT HOME: NONE OF THE TIME
QUESTION_2 LAST FOUR WEEKS HOW OFTEN HAVE YOU HAD SHORTNESS OF BREATH: ONCE OR TWICE A WEEK

## 2022-06-09 NOTE — PROGRESS NOTES
Tiana is a 22 year old who is being evaluated via a billable telephone visit.      What phone number would you like to be contacted at? [phone]   How would you like to obtain your AVS? MyChart    Assessment & Plan     Infection due to 2019 novel coronavirus  -tested positive 4 days ago, symptoms started 6 days ago, feeling better but still having frequent dry cough, denies shortness of breath , patient does not meet criteria for monoclonal antibody treatment, not within 5 days for Paxlovid treatment, discussed symptomatic treatment, start Prednisone, continue Albuterol nebs, stay off work until Tuesday next week   - predniSONE (DELTASONE) 20 MG tablet; Take 1 tablet (20 mg) by mouth 2 times daily for 5 days  - benzonatate (TESSALON) 100 MG capsule; Take 1 capsule (100 mg) by mouth 3 times daily as needed for cough      Return in about 1 week (around 6/16/2022), or if symptoms worsen or fail to improve.    KATELYN Blevins [facility]   Tiana is a 22 year old who presents for the following health issues     Chief Complaint   Patient presents with     Covid Concern     She tested positive for Covid on  Monday      Letter for School/Work     She has been out of work since Monday and needs a letter on when she can return to work.          HPI       COVID-19 Symptom Review  How many days ago did these symptoms start? 2 days     Are any of the following symptoms significant for you?  New or worsening difficulty breathing? Yes    Please describe what kind of difficulty you are having breathing:Mild dyspnea (able to do ADLs without difficulty, mild shortness of breath with activities such as climbing one or two flights of stairs or walking briskly). SOB     Worsening cough? Yes, it's a dry cough.     Fever or chills? Yes, the highest temperature was 102. No chills     Headache: YES    Sore throat: YES    Chest pain: no     Diarrhea: no    Body aches? YES    What  treatments has patient tried? Inhaler, nebulizer, dayquil, nyquil, tylenol    Does patient live in a nursing home, group home, or shelter? no  Does patient have a way to get food/medications during quarantined? Yes, I have a friend or family member who can help me.              Review of Systems   Constitutional, HEENT, cardiovascular, pulmonary, gi and gu systems are negative, except as otherwise noted.      Objective           Vitals:  No vitals were obtained today due to virtual visit.    Physical Exam   healthy, alert and no distress  PSYCH: Alert and oriented times 3; coherent speech, normal   rate and volume, able to articulate logical thoughts, able   to abstract reason, no tangential thoughts, no hallucinations   or delusions  Her affect is normal  RESP: No cough, no audible wheezing, able to talk in full sentences  Remainder of exam unable to be completed due to telephone visits              Phone call duration: 8 minutes

## 2022-06-09 NOTE — LETTER
[facility]  [address] [facility] [address]  Phone: [phone]    June 9, 2022        Tiana Sumner  [id number] [facility] [place] [address]          To whom it may concern:    RE: Tiana Sumner    Patient was seen and treated today at our clinic and missed work from 6/06 to 6/09  Patient may return to work 6/14/22 with the following:  No restrictions    Please contact me for questions or concerns.      Sincerely,        KATELYN Blevins CNP

## 2022-07-14 ENCOUNTER — MYC REFILL (OUTPATIENT)
Dept: FAMILY MEDICINE | Facility: CLINIC | Age: 22
End: 2022-07-14

## 2022-07-14 DIAGNOSIS — F32.1 CURRENT MODERATE EPISODE OF MAJOR DEPRESSIVE DISORDER WITHOUT PRIOR EPISODE (H): ICD-10-CM

## 2022-07-14 DIAGNOSIS — F41.1 GAD (GENERALIZED ANXIETY DISORDER): ICD-10-CM

## 2022-07-15 RX ORDER — VENLAFAXINE HYDROCHLORIDE 150 MG/1
150 CAPSULE, EXTENDED RELEASE ORAL DAILY
Qty: 90 CAPSULE | Refills: 0 | Status: SHIPPED | OUTPATIENT
Start: 2022-07-15 | End: 2022-10-13
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
## 2022-08-01 DIAGNOSIS — F41.1 GAD (GENERALIZED ANXIETY DISORDER): ICD-10-CM

## 2022-08-01 RX ORDER — HYDROXYZINE PAMOATE 50 MG/1
CAPSULE ORAL
Qty: 60 CAPSULE | Refills: 0 | Status: SHIPPED | OUTPATIENT
Start: 2022-08-01 | End: 2022-09-26

## 2022-10-12 DIAGNOSIS — F32.1 CURRENT MODERATE EPISODE OF MAJOR DEPRESSIVE DISORDER WITHOUT PRIOR EPISODE (H): ICD-10-CM

## 2022-10-12 DIAGNOSIS — F41.1 GAD (GENERALIZED ANXIETY DISORDER): ICD-10-CM

## 2022-10-12 NOTE — TELEPHONE ENCOUNTER
"Requested Prescriptions   Pending Prescriptions Disp Refills    venlafaxine (EFFEXOR XR) 150 MG 24 hr capsule [Pharmacy Med Name: VENLAFAXINE HCL ER 150MG CP24] 90 capsule 0     Sig: Take 1 capsule (150 mg) by mouth daily       Serotonin-Norepinephrine Reuptake Inhibitors  Failed - 10/12/2022  5:01 AM        Failed - PHQ-9 score of less than 5 in past 6 months     Please review last PHQ-9 score.           Failed - Normal serum creatinine on file in past 12 months     No lab results found.    Ok to refill medication if creatinine is low          Passed - Blood pressure under 140/90 in past 12 months     BP Readings from Last 3 Encounters:   01/05/22 122/80   08/12/21 112/78   06/17/21 131/80                 Passed - Medication is active on med list        Passed - Patient is age 18 or older        Passed - No active pregnancy on record        Passed - No positive pregnancy test in past 12 months        Passed - Recent (6 mo) or future (30 days) visit within the authorizing provider's specialty     Patient had office visit in the last 6 months or has a visit in the next 30 days with authorizing provider or within the authorizing provider's specialty.  See \"Patient Info\" tab in inbasket, or \"Choose Columns\" in Meds & Orders section of the refill encounter.                 "

## 2022-10-13 RX ORDER — VENLAFAXINE HYDROCHLORIDE 150 MG/1
150 CAPSULE, EXTENDED RELEASE ORAL DAILY
Qty: 90 CAPSULE | Refills: 0 | Status: SHIPPED | OUTPATIENT
Start: 2022-10-13 | End: 2023-01-09

## 2022-11-19 ENCOUNTER — HEALTH MAINTENANCE LETTER (OUTPATIENT)
Age: 22
End: 2022-11-19

## 2023-01-09 ENCOUNTER — MYC REFILL (OUTPATIENT)
Dept: FAMILY MEDICINE | Facility: CLINIC | Age: 23
End: 2023-01-09

## 2023-01-09 DIAGNOSIS — F41.1 GAD (GENERALIZED ANXIETY DISORDER): ICD-10-CM

## 2023-01-09 DIAGNOSIS — F32.1 CURRENT MODERATE EPISODE OF MAJOR DEPRESSIVE DISORDER WITHOUT PRIOR EPISODE (H): ICD-10-CM

## 2023-01-11 ENCOUNTER — TELEPHONE (OUTPATIENT)
Dept: FAMILY MEDICINE | Facility: CLINIC | Age: 23
End: 2023-01-11

## 2023-01-11 DIAGNOSIS — F41.1 GAD (GENERALIZED ANXIETY DISORDER): ICD-10-CM

## 2023-01-11 DIAGNOSIS — F32.1 CURRENT MODERATE EPISODE OF MAJOR DEPRESSIVE DISORDER WITHOUT PRIOR EPISODE (H): ICD-10-CM

## 2023-01-11 RX ORDER — VENLAFAXINE HYDROCHLORIDE 150 MG/1
150 CAPSULE, EXTENDED RELEASE ORAL DAILY
Qty: 90 CAPSULE | Refills: 0 | Status: SHIPPED | OUTPATIENT
Start: 2023-01-11 | End: 2023-01-20

## 2023-01-11 NOTE — TELEPHONE ENCOUNTER
"Requested Prescriptions   Pending Prescriptions Disp Refills     venlafaxine (EFFEXOR XR) 150 MG 24 hr capsule 90 capsule 0     Sig: Take 1 capsule (150 mg) by mouth daily       Serotonin-Norepinephrine Reuptake Inhibitors  Failed - 1/9/2023  9:32 AM        Failed - Blood pressure under 140/90 in past 12 months     BP Readings from Last 3 Encounters:   01/05/22 122/80   08/12/21 112/78   06/17/21 131/80                 Failed - Normal serum creatinine on file in past 12 months     No lab results found.    Ok to refill medication if creatinine is low          Failed - Recent (6 mo) or future (30 days) visit within the authorizing provider's specialty     Patient had office visit in the last 6 months or has a visit in the next 30 days with authorizing provider or within the authorizing provider's specialty.  See \"Patient Info\" tab in inbasket, or \"Choose Columns\" in Meds & Orders section of the refill encounter.            Passed - PHQ-9 score of less than 5 in past 6 months     Please review last PHQ-9 score.           Passed - Medication is active on med list        Passed - Patient is age 18 or older        Passed - No active pregnancy on record        Passed - No positive pregnancy test in past 12 months             " Positive end tidal Co2 noted/Breath sounds bilateral/Appropriate capnography/Chest X-Ray

## 2023-01-11 NOTE — TELEPHONE ENCOUNTER
"Tiana needs a follow-up appointment. Thank you!    Ce Sahu, RN      Requested Prescriptions   Pending Prescriptions Disp Refills     venlafaxine (EFFEXOR XR) 150 MG 24 hr capsule [Pharmacy Med Name: VENLAFAXINE HCL ER 150MG CP24] 90 capsule 0     Sig: TAKE ONE CAPSULE BY MOUTH ONCE DAILY       Serotonin-Norepinephrine Reuptake Inhibitors  Failed - 1/11/2023  5:01 AM        Failed - Blood pressure under 140/90 in past 12 months     BP Readings from Last 3 Encounters:   01/05/22 122/80   08/12/21 112/78   06/17/21 131/80                 Failed - Normal serum creatinine on file in past 12 months     No lab results found.    Ok to refill medication if creatinine is low          Failed - Recent (6 mo) or future (30 days) visit within the authorizing provider's specialty     Patient had office visit in the last 6 months or has a visit in the next 30 days with authorizing provider or within the authorizing provider's specialty.  See \"Patient Info\" tab in inbasket, or \"Choose Columns\" in Meds & Orders section of the refill encounter.            Passed - PHQ-9 score of less than 5 in past 6 months     Please review last PHQ-9 score.           Passed - Medication is active on med list        Passed - Patient is age 18 or older        Passed - No active pregnancy on record        Passed - No positive pregnancy test in past 12 months             "

## 2023-01-12 RX ORDER — VENLAFAXINE HYDROCHLORIDE 150 MG/1
CAPSULE, EXTENDED RELEASE ORAL
Qty: 90 CAPSULE | Refills: 0 | OUTPATIENT
Start: 2023-01-12

## 2023-01-12 NOTE — TELEPHONE ENCOUNTER
Duplicate. Patient notified about the appointment.    All qued up to close RN please sign off. I don't have security to close.    Zaida Vázquez PSC on 1/12/2023 at 10:33 AM

## 2023-01-20 ENCOUNTER — VIRTUAL VISIT (OUTPATIENT)
Dept: FAMILY MEDICINE | Facility: CLINIC | Age: 23
End: 2023-01-20
Payer: COMMERCIAL

## 2023-01-20 DIAGNOSIS — F32.1 CURRENT MODERATE EPISODE OF MAJOR DEPRESSIVE DISORDER WITHOUT PRIOR EPISODE (H): ICD-10-CM

## 2023-01-20 DIAGNOSIS — F41.1 GAD (GENERALIZED ANXIETY DISORDER): ICD-10-CM

## 2023-01-20 DIAGNOSIS — E66.09 CLASS 1 OBESITY DUE TO EXCESS CALORIES WITHOUT SERIOUS COMORBIDITY IN ADULT, UNSPECIFIED BMI: Primary | ICD-10-CM

## 2023-01-20 DIAGNOSIS — E66.811 CLASS 1 OBESITY DUE TO EXCESS CALORIES WITHOUT SERIOUS COMORBIDITY IN ADULT, UNSPECIFIED BMI: Primary | ICD-10-CM

## 2023-01-20 PROCEDURE — 99214 OFFICE O/P EST MOD 30 MIN: CPT | Mod: TEL | Performed by: NURSE PRACTITIONER

## 2023-01-20 PROCEDURE — 96127 BRIEF EMOTIONAL/BEHAV ASSMT: CPT | Mod: 59 | Performed by: NURSE PRACTITIONER

## 2023-01-20 RX ORDER — VENLAFAXINE HYDROCHLORIDE 150 MG/1
150 CAPSULE, EXTENDED RELEASE ORAL DAILY
Qty: 90 CAPSULE | Refills: 3 | Status: SHIPPED | OUTPATIENT
Start: 2023-01-20 | End: 2023-04-20

## 2023-01-20 RX ORDER — LORAZEPAM 0.5 MG/1
0.5 TABLET ORAL DAILY PRN
Qty: 15 TABLET | Refills: 1 | Status: SHIPPED | OUTPATIENT
Start: 2023-01-20 | End: 2023-10-02

## 2023-01-20 RX ORDER — PHENTERMINE HYDROCHLORIDE 15 MG/1
15 CAPSULE ORAL EVERY MORNING
Qty: 30 CAPSULE | Refills: 1 | Status: SHIPPED | OUTPATIENT
Start: 2023-01-20 | End: 2023-03-21

## 2023-01-20 ASSESSMENT — ANXIETY QUESTIONNAIRES
6. BECOMING EASILY ANNOYED OR IRRITABLE: NOT AT ALL
7. FEELING AFRAID AS IF SOMETHING AWFUL MIGHT HAPPEN: MORE THAN HALF THE DAYS
3. WORRYING TOO MUCH ABOUT DIFFERENT THINGS: MORE THAN HALF THE DAYS
5. BEING SO RESTLESS THAT IT IS HARD TO SIT STILL: MORE THAN HALF THE DAYS
GAD7 TOTAL SCORE: 12
GAD7 TOTAL SCORE: 12
2. NOT BEING ABLE TO STOP OR CONTROL WORRYING: MORE THAN HALF THE DAYS
1. FEELING NERVOUS, ANXIOUS, OR ON EDGE: MORE THAN HALF THE DAYS
IF YOU CHECKED OFF ANY PROBLEMS ON THIS QUESTIONNAIRE, HOW DIFFICULT HAVE THESE PROBLEMS MADE IT FOR YOU TO DO YOUR WORK, TAKE CARE OF THINGS AT HOME, OR GET ALONG WITH OTHER PEOPLE: NOT DIFFICULT AT ALL

## 2023-01-20 ASSESSMENT — PATIENT HEALTH QUESTIONNAIRE - PHQ9
SUM OF ALL RESPONSES TO PHQ QUESTIONS 1-9: 8
5. POOR APPETITE OR OVEREATING: MORE THAN HALF THE DAYS

## 2023-01-20 NOTE — PROGRESS NOTES
Tiana is a 23 year old who is being evaluated via a billable telephone visit.      What phone number would you like to be contacted at? [phone]   How would you like to obtain your AVS? Lydia    Distant Location (provider location):  On-site    Assessment & Plan     JAZMIN (generalized anxiety disorder)    - REVIEW OF HEALTH MAINTENANCE PROTOCOL ORDERS  - LORazepam (ATIVAN) 0.5 MG tablet; Take 1 tablet (0.5 mg) by mouth daily as needed for anxiety  - venlafaxine (EFFEXOR XR) 150 MG 24 hr capsule; Take 1 capsule (150 mg) by mouth daily    Current moderate episode of major depressive disorder without prior episode (H)  -well controlled   - REVIEW OF HEALTH MAINTENANCE PROTOCOL ORDERS  - venlafaxine (EFFEXOR XR) 150 MG 24 hr capsule; Take 1 capsule (150 mg) by mouth daily    Class 1 obesity due to excess calories without serious comorbidity in adult, unspecified BMI    - REVIEW OF HEALTH MAINTENANCE PROTOCOL ORDERS  - phentermine (ADIPEX-P) 15 MG capsule; Take 1 capsule (15 mg) by mouth every morning  - Comprehensive metabolic panel (BMP + Alb, Alk Phos, ALT, AST, Total. Bili, TP); Future  - Hemoglobin A1c; Future  - Lipid panel reflex to direct LDL Fasting; Future  - TSH with free T4 reflex; Future  - CBC with platelets; Future      KATELYN Blevins [facility]    Subjective   Tiana is a 23 year old patient presenting for the following health issues:  Anxiety    Chief Complaint   Patient presents with     Anxiety     Weight Problem     States she has been having trouble losing weight.        HPI     Depression and Anxiety Follow-Up    How are you doing with your depression since your last visit? Improved     How are you doing with your anxiety since your last visit?  Improved     Are you having other symptoms that might be associated with depression or anxiety? No    Have you had a significant life event? No     Do you have any concerns with your use of alcohol or other drugs?  No    Social History     Tobacco Use     Smoking status: Never     Smokeless tobacco: Never   Vaping Use     Vaping Use: Never used   Substance Use Topics     Alcohol use: Yes     Comment: occ     Drug use: No     PHQ 8/12/2021 4/25/2022 10/13/2022   PHQ-9 Total Score 16 5 4   Q9: Thoughts of better off dead/self-harm past 2 weeks Not at all Not at all Not at all     JAZMIN-7 SCORE 5/20/2021 8/12/2021 4/25/2022   Total Score - - -   Total Score 19 20 4     Last PHQ-9 1/20/2023   1.  Little interest or pleasure in doing things 0   2.  Feeling down, depressed, or hopeless 0   3.  Trouble falling or staying asleep, or sleeping too much 2   4.  Feeling tired or having little energy 2   5.  Poor appetite or overeating 2   6.  Feeling bad about yourself 0   7.  Trouble concentrating 2   8.  Moving slowly or restless 0   Q9: Thoughts of better off dead/self-harm past 2 weeks 0   PHQ-9 Total Score 8   Difficulty at work, home, or with people Not difficult at all     JAZMIN-7  1/20/2023   1. Feeling nervous, anxious, or on edge 2   2. Not being able to stop or control worrying 2   3. Worrying too much about different things 2   4. Trouble relaxing 2   5. Being so restless that it is hard to sit still 2   6. Becoming easily annoyed or irritable 0   7. Feeling afraid, as if something awful might happen 2   JAZMIN-7 Total Score 12   If you checked any problems, how difficult have they made it for you to do your work, take care of things at home, or get along with other people? Not difficult at all           Review of Systems   Constitutional, HEENT, cardiovascular, pulmonary, gi and gu systems are negative, except as otherwise noted.      Objective           Vitals:  No vitals were obtained today due to virtual visit.    Physical Exam   healthy, alert and no distress  PSYCH: Alert and oriented times 3; coherent speech, normal   rate and volume, able to articulate logical thoughts, able   to abstract reason, no tangential thoughts, no  hallucinations   or delusions  Her affect is normal  RESP: No cough, no audible wheezing, able to talk in full sentences  Remainder of exam unable to be completed due to telephone visits                Phone call duration: 14 minutes

## 2023-01-29 DIAGNOSIS — F41.1 GAD (GENERALIZED ANXIETY DISORDER): ICD-10-CM

## 2023-01-31 RX ORDER — HYDROXYZINE PAMOATE 50 MG/1
CAPSULE ORAL
Qty: 60 CAPSULE | Refills: 0 | Status: SHIPPED | OUTPATIENT
Start: 2023-01-31 | End: 2023-03-28

## 2023-02-17 ENCOUNTER — LAB (OUTPATIENT)
Dept: LAB | Facility: CLINIC | Age: 23
End: 2023-02-17
Payer: COMMERCIAL

## 2023-02-17 DIAGNOSIS — Z11.4 SCREENING FOR HIV (HUMAN IMMUNODEFICIENCY VIRUS): ICD-10-CM

## 2023-02-17 DIAGNOSIS — E66.09 CLASS 1 OBESITY DUE TO EXCESS CALORIES WITHOUT SERIOUS COMORBIDITY IN ADULT, UNSPECIFIED BMI: ICD-10-CM

## 2023-02-17 DIAGNOSIS — Z11.3 SCREENING FOR STDS (SEXUALLY TRANSMITTED DISEASES): ICD-10-CM

## 2023-02-17 DIAGNOSIS — E66.811 CLASS 1 OBESITY DUE TO EXCESS CALORIES WITHOUT SERIOUS COMORBIDITY IN ADULT, UNSPECIFIED BMI: ICD-10-CM

## 2023-02-17 DIAGNOSIS — Z11.59 NEED FOR HEPATITIS C SCREENING TEST: ICD-10-CM

## 2023-02-17 LAB
ALBUMIN SERPL BCG-MCNC: 4.8 G/DL (ref 3.5–5.2)
ALP SERPL-CCNC: 81 U/L (ref 35–104)
ALT SERPL W P-5'-P-CCNC: 23 U/L (ref 10–35)
ANION GAP SERPL CALCULATED.3IONS-SCNC: 8 MMOL/L (ref 7–15)
AST SERPL W P-5'-P-CCNC: 20 U/L (ref 10–35)
BILIRUB SERPL-MCNC: 0.6 MG/DL
BUN SERPL-MCNC: 11.9 MG/DL (ref 6–20)
CALCIUM SERPL-MCNC: 10.1 MG/DL (ref 8.6–10)
CHLORIDE SERPL-SCNC: 103 MMOL/L (ref 98–107)
CHOLEST SERPL-MCNC: 143 MG/DL
CREAT SERPL-MCNC: 0.97 MG/DL (ref 0.51–0.95)
DEPRECATED HCO3 PLAS-SCNC: 27 MMOL/L (ref 22–29)
ERYTHROCYTE [DISTWIDTH] IN BLOOD BY AUTOMATED COUNT: 13.5 % (ref 10–15)
GFR SERPL CREATININE-BSD FRML MDRD: 83 ML/MIN/1.73M2
GLUCOSE SERPL-MCNC: 108 MG/DL (ref 70–99)
HBA1C MFR BLD: 5.2 % (ref 0–5.6)
HCT VFR BLD AUTO: 50.2 % (ref 35–47)
HDLC SERPL-MCNC: 69 MG/DL
HGB BLD-MCNC: 17.3 G/DL (ref 11.7–15.7)
LDLC SERPL CALC-MCNC: 65 MG/DL
MCH RBC QN AUTO: 30.4 PG (ref 26.5–33)
MCHC RBC AUTO-ENTMCNC: 34.5 G/DL (ref 31.5–36.5)
MCV RBC AUTO: 88 FL (ref 78–100)
NONHDLC SERPL-MCNC: 74 MG/DL
PLATELET # BLD AUTO: 239 10E3/UL (ref 150–450)
POTASSIUM SERPL-SCNC: 4.5 MMOL/L (ref 3.4–5.3)
PROT SERPL-MCNC: 7.8 G/DL (ref 6.4–8.3)
RBC # BLD AUTO: 5.7 10E6/UL (ref 3.8–5.2)
SODIUM SERPL-SCNC: 138 MMOL/L (ref 136–145)
TRIGL SERPL-MCNC: 44 MG/DL
TSH SERPL DL<=0.005 MIU/L-ACNC: 1.72 UIU/ML (ref 0.3–4.2)
WBC # BLD AUTO: 6.3 10E3/UL (ref 4–11)

## 2023-02-17 PROCEDURE — 36415 COLL VENOUS BLD VENIPUNCTURE: CPT

## 2023-02-17 PROCEDURE — 80053 COMPREHEN METABOLIC PANEL: CPT

## 2023-02-17 PROCEDURE — 87389 HIV-1 AG W/HIV-1&-2 AB AG IA: CPT

## 2023-02-17 PROCEDURE — 85027 COMPLETE CBC AUTOMATED: CPT

## 2023-02-17 PROCEDURE — 80061 LIPID PANEL: CPT

## 2023-02-17 PROCEDURE — 83036 HEMOGLOBIN GLYCOSYLATED A1C: CPT

## 2023-02-17 PROCEDURE — 87491 CHLMYD TRACH DNA AMP PROBE: CPT

## 2023-02-17 PROCEDURE — 84443 ASSAY THYROID STIM HORMONE: CPT

## 2023-02-17 PROCEDURE — 86803 HEPATITIS C AB TEST: CPT

## 2023-02-17 PROCEDURE — 87591 N.GONORRHOEAE DNA AMP PROB: CPT

## 2023-02-18 LAB
C TRACH DNA SPEC QL NAA+PROBE: NEGATIVE
HCV AB SERPL QL IA: NONREACTIVE
HIV 1+2 AB+HIV1 P24 AG SERPL QL IA: NONREACTIVE
N GONORRHOEA DNA SPEC QL NAA+PROBE: NEGATIVE

## 2023-03-21 ENCOUNTER — MYC MEDICAL ADVICE (OUTPATIENT)
Dept: FAMILY MEDICINE | Facility: CLINIC | Age: 23
End: 2023-03-21
Payer: COMMERCIAL

## 2023-03-21 DIAGNOSIS — E66.09 CLASS 1 OBESITY DUE TO EXCESS CALORIES WITHOUT SERIOUS COMORBIDITY IN ADULT, UNSPECIFIED BMI: ICD-10-CM

## 2023-03-21 DIAGNOSIS — E66.811 CLASS 1 OBESITY DUE TO EXCESS CALORIES WITHOUT SERIOUS COMORBIDITY IN ADULT, UNSPECIFIED BMI: ICD-10-CM

## 2023-03-21 RX ORDER — PHENTERMINE HYDROCHLORIDE 15 MG/1
15 CAPSULE ORAL EVERY MORNING
Qty: 30 CAPSULE | Refills: 0 | Status: SHIPPED | OUTPATIENT
Start: 2023-03-21 | End: 2023-10-02

## 2023-03-21 NOTE — TELEPHONE ENCOUNTER
See [facility] [employer] [facility] [employer] message, order pended and routing to PCP for review.    Gracia Mansfield RN  [facility]

## 2023-03-27 DIAGNOSIS — F41.1 GAD (GENERALIZED ANXIETY DISORDER): ICD-10-CM

## 2023-03-28 RX ORDER — HYDROXYZINE PAMOATE 50 MG/1
CAPSULE ORAL
Qty: 60 CAPSULE | Refills: 0 | Status: SHIPPED | OUTPATIENT
Start: 2023-03-28 | End: 2023-05-26

## 2023-04-09 ENCOUNTER — HEALTH MAINTENANCE LETTER (OUTPATIENT)
Age: 23
End: 2023-04-09

## 2023-04-20 ENCOUNTER — MYC MEDICAL ADVICE (OUTPATIENT)
Dept: FAMILY MEDICINE | Facility: CLINIC | Age: 23
End: 2023-04-20
Payer: COMMERCIAL

## 2023-04-20 DIAGNOSIS — F32.1 CURRENT MODERATE EPISODE OF MAJOR DEPRESSIVE DISORDER WITHOUT PRIOR EPISODE (H): ICD-10-CM

## 2023-04-20 DIAGNOSIS — F41.1 GAD (GENERALIZED ANXIETY DISORDER): ICD-10-CM

## 2023-04-20 RX ORDER — VENLAFAXINE HYDROCHLORIDE 150 MG/1
150 CAPSULE, EXTENDED RELEASE ORAL DAILY
Qty: 90 CAPSULE | Refills: 3 | Status: SHIPPED | OUTPATIENT
Start: 2023-04-20 | End: 2023-07-17

## 2023-04-20 NOTE — TELEPHONE ENCOUNTER
Pending Prescriptions:                       Disp   Refills    venlafaxine (EFFEXOR XR) 150 MG 24 hr cap*90 cap*3            Sig: Take 1 capsule (150 mg) by mouth daily    Routing refill request to provider for review/approval because:  Labs out of range:    BP Readings from Last 3 Encounters:   01/05/22 122/80   08/12/21 112/78   06/17/21 131/80     PHQ-9 score:      1/20/2023     9:24 AM   PHQ   PHQ-9 Total Score 8   Q9: Thoughts of better off dead/self-harm past 2 weeks Not at all           Creatinine   Date Value Ref Range Status   02/17/2023 0.97 (H) 0.51 - 0.95 mg/dL Final   12/18/2013 0.74 (H) 0.39 - 0.73 mg/dL Final       Giancarlo Bond RN

## 2023-07-14 ENCOUNTER — MYC MEDICAL ADVICE (OUTPATIENT)
Dept: FAMILY MEDICINE | Facility: CLINIC | Age: 23
End: 2023-07-14
Payer: COMMERCIAL

## 2023-07-17 ENCOUNTER — TELEPHONE (OUTPATIENT)
Dept: FAMILY MEDICINE | Facility: CLINIC | Age: 23
End: 2023-07-17
Payer: COMMERCIAL

## 2023-07-17 DIAGNOSIS — F41.1 GAD (GENERALIZED ANXIETY DISORDER): ICD-10-CM

## 2023-07-17 DIAGNOSIS — F32.1 CURRENT MODERATE EPISODE OF MAJOR DEPRESSIVE DISORDER WITHOUT PRIOR EPISODE (H): ICD-10-CM

## 2023-07-17 NOTE — TELEPHONE ENCOUNTER
Pending Prescriptions:                       Disp   Refills    venlafaxine (EFFEXOR XR) 150 MG 24 hr cap*90 cap*3            Sig: Take 1 capsule (150 mg) by mouth daily   [facility] cancelled prescription sent to them, patient needs new rx sent to [facility] pharmacy. Yulia Cortez on 7/17/2023 at 3:08 PM

## 2023-07-18 RX ORDER — VENLAFAXINE HYDROCHLORIDE 150 MG/1
150 CAPSULE, EXTENDED RELEASE ORAL DAILY
Qty: 90 CAPSULE | Refills: 2 | Status: SHIPPED | OUTPATIENT
Start: 2023-07-18 | End: 2024-03-08

## 2023-10-01 ASSESSMENT — PATIENT HEALTH QUESTIONNAIRE - PHQ9
10. IF YOU CHECKED OFF ANY PROBLEMS, HOW DIFFICULT HAVE THESE PROBLEMS MADE IT FOR YOU TO DO YOUR WORK, TAKE CARE OF THINGS AT HOME, OR GET ALONG WITH OTHER PEOPLE: SOMEWHAT DIFFICULT
SUM OF ALL RESPONSES TO PHQ QUESTIONS 1-9: 2
SUM OF ALL RESPONSES TO PHQ QUESTIONS 1-9: 2

## 2023-10-01 ASSESSMENT — ASTHMA QUESTIONNAIRES: ACT_TOTALSCORE: 25

## 2023-10-02 ENCOUNTER — OFFICE VISIT (OUTPATIENT)
Dept: DERMATOLOGY | Facility: CLINIC | Age: 23
End: 2023-10-02
Attending: NURSE PRACTITIONER
Payer: COMMERCIAL

## 2023-10-02 ENCOUNTER — OFFICE VISIT (OUTPATIENT)
Dept: FAMILY MEDICINE | Facility: CLINIC | Age: 23
End: 2023-10-02
Payer: COMMERCIAL

## 2023-10-02 VITALS
DIASTOLIC BLOOD PRESSURE: 70 MMHG | TEMPERATURE: 97.6 F | RESPIRATION RATE: 16 BRPM | WEIGHT: 180.9 LBS | HEART RATE: 70 BPM | OXYGEN SATURATION: 100 % | BODY MASS INDEX: 29.2 KG/M2 | SYSTOLIC BLOOD PRESSURE: 110 MMHG

## 2023-10-02 DIAGNOSIS — Z23 NEED FOR TDAP VACCINATION: ICD-10-CM

## 2023-10-02 DIAGNOSIS — D48.5 NEOPLASM OF UNCERTAIN BEHAVIOR OF SKIN: ICD-10-CM

## 2023-10-02 DIAGNOSIS — D22.9 CHANGE IN COLOR OF SKIN MOLE: ICD-10-CM

## 2023-10-02 DIAGNOSIS — F41.1 GAD (GENERALIZED ANXIETY DISORDER): Primary | ICD-10-CM

## 2023-10-02 DIAGNOSIS — Z23 NEED FOR PROPHYLACTIC VACCINATION AND INOCULATION AGAINST INFLUENZA: ICD-10-CM

## 2023-10-02 DIAGNOSIS — F32.4 MAJOR DEPRESSIVE DISORDER WITH SINGLE EPISODE, IN PARTIAL REMISSION (H): ICD-10-CM

## 2023-10-02 DIAGNOSIS — L85.8 KERATOSIS PILARIS: ICD-10-CM

## 2023-10-02 DIAGNOSIS — N75.0 BARTHOLIN CYST: ICD-10-CM

## 2023-10-02 PROBLEM — F32.9 MAJOR DEPRESSION, SINGLE EPISODE: Status: ACTIVE | Noted: 2023-10-02

## 2023-10-02 PROCEDURE — 88305 TISSUE EXAM BY PATHOLOGIST: CPT | Performed by: DERMATOLOGY

## 2023-10-02 PROCEDURE — 90686 IIV4 VACC NO PRSV 0.5 ML IM: CPT | Performed by: NURSE PRACTITIONER

## 2023-10-02 PROCEDURE — 90472 IMMUNIZATION ADMIN EACH ADD: CPT | Performed by: NURSE PRACTITIONER

## 2023-10-02 PROCEDURE — 90715 TDAP VACCINE 7 YRS/> IM: CPT | Performed by: NURSE PRACTITIONER

## 2023-10-02 PROCEDURE — 99214 OFFICE O/P EST MOD 30 MIN: CPT | Mod: 25 | Performed by: NURSE PRACTITIONER

## 2023-10-02 PROCEDURE — 90471 IMMUNIZATION ADMIN: CPT | Performed by: NURSE PRACTITIONER

## 2023-10-02 PROCEDURE — 11102 TANGNTL BX SKIN SINGLE LES: CPT | Performed by: PHYSICIAN ASSISTANT

## 2023-10-02 PROCEDURE — 11103 TANGNTL BX SKIN EA SEP/ADDL: CPT | Performed by: PHYSICIAN ASSISTANT

## 2023-10-02 RX ORDER — TRIAMCINOLONE ACETONIDE 1 MG/G
CREAM TOPICAL 2 TIMES DAILY
Qty: 80 G | Refills: 1 | Status: SHIPPED | OUTPATIENT
Start: 2023-10-02

## 2023-10-02 RX ORDER — LORAZEPAM 0.5 MG/1
0.5 TABLET ORAL DAILY PRN
Qty: 15 TABLET | Refills: 1 | Status: SHIPPED | OUTPATIENT
Start: 2023-10-02 | End: 2024-03-08

## 2023-10-02 ASSESSMENT — PAIN SCALES - GENERAL: PAINLEVEL: NO PAIN (0)

## 2023-10-02 NOTE — LETTER
10/2/2023         RE: Tiana Sumner  [address]  [facility] [place] [address]        Dear Colleague,    Thank you for referring your patient, Tiana Sumner, to the [facility]. Please see a copy of my visit note below.    HPI:   Chief complaints: Tiana Sumner is a pleasant 23 year old female who presents for evaluation of several moles of concern. She has two larger moles on the back and one mole on the left shoulder which have been growing. She would like them removed. No history of skin cancer.     Shx: getting  in June in [place]  PHYSICAL EXAM:    There were no vitals taken for this visit.  Skin exam performed as follows: Type 2 skin. Mood appropriate  Alert and Oriented X 3. Well developed, well nourished in no distress.  General appearance: Normal  Head including face: Normal  Eyes: conjunctiva and lids: Normal  Mouth: Lips, teeth, gums: Normal  Neck: Normal  Skin: Scalp and body hair: See below    9 mm fleshy papule on the left medial back  5 mm fleshy papule on the right posterior shoulder  6 mm brown macule on the left anterior shoulder      ASSESSMENT/PLAN:     Dermal nevus r/o atypicality on the left medial back, right posterior shoulder, left anterior shoulder. Discussed shave removal vs excision with Dr Mason; scarring dicussed. She prefers to proceed with shave removal today. Shave biopsy performed.  Area cleaned and anesthetized with 1% lidocaine with epinephrine.  Dermablade used to remove the lesion and sent to pathology. Bleeding was cauterized. Pt tolerated procedure well with no complications.             Follow-up: PRN  CC:   Scribed By: Zeny Valenzuela, MS, PAMATT      Again, thank you for allowing me to participate in the care of your patient.        Sincerely,        Zeny Valenzuela PA-C

## 2023-10-02 NOTE — PATIENT INSTRUCTIONS
"Start Kenalog twice daily for up to 2 weeks     Can also try CeraVe cream for \"rough and bumpy skin\"     Follow up with OB and derm  "

## 2023-10-02 NOTE — PATIENT INSTRUCTIONS
Wound Care Instructions     FOR SUPERFICIAL WOUNDS     [facility]  [phone]                 AFTER 24 HOURS YOU SHOULD REMOVE THE BANDAGE AND BEGIN DAILY DRESSING CHANGES AS FOLLOWS:     1) Remove Dressing.     2) Clean and dry the area with tap water using a Q-tip or sterile gauze pad.     3) Apply Vaseline, Aquaphor, Polysporin ointment or Bacitracin ointment over entire wound.  Do NOT use Neosporin ointment.     4) Cover the wound with a band-aid, or a sterile non-stick gauze pad and micropore paper tape    REPEAT THESE INSTRUCTIONS AT LEAST ONCE A DAY UNTIL THE WOUND HAS COMPLETELY HEALED.    It is an old wives tale that a wound heals better when it is exposed to air and allowed to dry out. The wound will heal faster with a better cosmetic result if it is kept moist with ointment and covered with a bandage.    **Do not let the wound dry out.**    Supplies Needed:      *Cotton tipped applicators (Q-tips)    *Vaseline, Aquaphor, Polysporin or Bacitracin Ointment (NOT NEOSPORIN)    *Band-aids or non-stick gauze pads and micropore paper tape.      PATIENT INFORMATION:    During the healing process you will notice a number of changes. All wounds develop a small halo of redness surrounding the wound.  This means healing is occurring. Severe itching with extensive redness usually indicates sensitivity to the ointment or bandage tape used to dress the wound.  You should call our office if this develops.      Swelling  and/or discoloration around your surgical site is common, particularly when performed around the eye.    All wounds normally drain.  The larger the wound the more drainage there will be.  After 7-10 days, you will notice the wound beginning to shrink and new skin will begin to grow.  The wound is healed when you can see skin has formed over the entire area.  A healed wound has a healthy, shiny look to the surface and is red to dark pink in color to normalize.  Wounds may  take approximately 4-6 weeks to heal.  Larger wounds may take 6-8 weeks.  After the wound is healed you may discontinue dressing changes.    You may experience a sensation of tightness as your wound heals. This is normal and will gradually subside.    Your healed wound may be sensitive to temperature changes. This sensitivity improves with time, but if you re having a lot of discomfort, try to avoid temperature extremes.    Patients frequently experience itching after their wound appears to have healed because of the continue healing under the skin.  Plain Vaseline will help relieve the itching.      POSSIBLE COMPLICATIONS    BLEEDING:    Leave the bandage in place.  Use tightly rolled up gauze or a cloth to apply direct pressure over the bandage for 30  minutes.  Reapply pressure for an additional 30 minutes if necessary  Use additional gauze and tape to maintain pressure once the bleeding has stopped.

## 2023-10-02 NOTE — PROGRESS NOTES
Assessment & Plan     JAZMIN (generalized anxiety disorder)  -doing well   - LORazepam (ATIVAN) 0.5 MG tablet; Take 1 tablet (0.5 mg) by mouth daily as needed for anxiety    Need for prophylactic vaccination and inoculation against influenza    - INFLUENZA VACCINE IM > 6 MONTHS VALENT IIV4 (AFLURIA/FLUZONE)    Need for Tdap vaccination    - TDAP 10-64Y (ADACEL,BOOSTRIX)    Change in color of skin mole  -recommended dermatology consult, possible biopsy   - Adult Dermatology Referral; Future    Keratosis pilaris    - triamcinolone (KENALOG) 0.1 % external cream; Apply topically 2 times daily    Bartholin cyst  -cyst on the left labia, recommended to follow up with OB GYN     Major depressive disorder with single episode, in partial remission (H24)  -well controlled       KATELYN Blevins [facility]    Christa Montiel is a 23 year old, presenting for the following health issues:  Mass and Imm/Inj (Flu Shot)        10/2/2023     9:11 AM   Additional Questions   Roomed by partha         10/2/2023     9:11 AM   Patient Reported Additional Medications   Patient reports taking the following new medications none       History of Present Illness       Reason for visit:  All over body moles and lumps changing  Symptom onset:  More than a month  Symptoms include:  Lumps and moles growing larger sometimes painful  Symptom intensity:  Mild  Symptom progression:  Worsening  Had these symptoms before:  No  What makes it worse:  No  What makes it better:  No    She eats 0-1 servings of fruits and vegetables daily.She consumes 1 sweetened beverage(s) daily.She exercises with enough effort to increase her heart rate 60 or more minutes per day.  She exercises with enough effort to increase her heart rate 6 days per week.   She is taking medications regularly.           Review of Systems   Constitutional, HEENT, cardiovascular, pulmonary, gi and gu systems are negative, except as otherwise  noted.      Objective    /70   Pulse 70   Temp 97.6  F (36.4  C) (Tympanic)   Resp 16   Wt 82.1 kg (180 lb 14.4 oz)   SpO2 100%   BMI 29.20 kg/m    Body mass index is 29.2 kg/m .  Physical Exam   GENERAL: healthy, alert and no distress   (female): normal female external genitalia, normal urethral meatus , and normal cervix, adnexae, and uterus without masses.  MS: no gross musculoskeletal defects noted, no edema, non-tender left side bartholin cyst   SKIN: 3 nevus with irregular borders, one on upper middle back, another right upper posterior shoulder area and small mole on the neck. Keratosis pilaris on bilateral upper arms and upper back   NEURO: Normal strength and tone, mentation intact and speech normal  PSYCH: mentation appears normal, affect normal/bright

## 2023-10-02 NOTE — PROGRESS NOTES
HPI:   Chief complaints: Tiana Sumner is a pleasant 23 year old female who presents for evaluation of several moles of concern. She has two larger moles on the back and one mole on the left shoulder which have been growing. She would like them removed. No history of skin cancer.     Shx: getting  in June in [place]  PHYSICAL EXAM:    There were no vitals taken for this visit.  Skin exam performed as follows: Type 2 skin. Mood appropriate  Alert and Oriented X 3. Well developed, well nourished in no distress.  General appearance: Normal  Head including face: Normal  Eyes: conjunctiva and lids: Normal  Mouth: Lips, teeth, gums: Normal  Neck: Normal  Skin: Scalp and body hair: See below    9 mm fleshy papule on the left medial back  5 mm fleshy papule on the right posterior shoulder  6 mm brown macule on the left anterior shoulder      ASSESSMENT/PLAN:     Dermal nevus r/o atypicality on the left medial back, right posterior shoulder, left anterior shoulder. Discussed shave removal vs excision with Dr Mason; scarring dicussed. She prefers to proceed with shave removal today. Shave biopsy performed.  Area cleaned and anesthetized with 1% lidocaine with epinephrine.  Dermablade used to remove the lesion and sent to pathology. Bleeding was cauterized. Pt tolerated procedure well with no complications.             Follow-up: PRN  CC:   Scribed By: Zeny Gardner, MS, PA-C

## 2023-10-02 NOTE — NURSING NOTE
Prior to immunization administration, verified patients identity using patient s name and date of birth. Please see Immunization Activity for additional information.     Screening Questionnaire for Adult Immunization    Are you sick today?   No   Do you have allergies to medications, food, a vaccine component or latex?   No   Have you ever had a serious reaction after receiving a vaccination?   No   Do you have a long-term health problem with heart, lung, kidney, or metabolic disease (e.g., diabetes), asthma, a blood disorder, no spleen, complement component deficiency, a cochlear implant, or a spinal fluid leak?  Are you on long-term aspirin therapy?   No   Do you have cancer, leukemia, HIV/AIDS, or any other immune system problem?   No   Do you have a parent, brother, or sister with an immune system problem?   No   In the past 3 months, have you taken medications that affect  your immune system, such as prednisone, other steroids, or anticancer drugs; drugs for the treatment of rheumatoid arthritis, Crohn s disease, or psoriasis; or have you had radiation treatments?   No   Have you had a seizure, or a brain or other nervous system problem?   No   During the past year, have you received a transfusion of blood or blood    products, or been given immune (gamma) globulin or antiviral drug?   No   For women: Are you pregnant or is there a chance you could become       pregnant during the next month?   No   Have you received any vaccinations in the past 4 weeks?   No     Immunization questionnaire answers were all negative.      Patient instructed to remain in clinic for 15 minutes afterwards, and to report any adverse reactions.     Screening performed by Yovany Gupta CMA on 10/2/2023 at 9:42 AM.

## 2023-10-04 LAB
PATH REPORT.COMMENTS IMP SPEC: NORMAL
PATH REPORT.COMMENTS IMP SPEC: NORMAL
PATH REPORT.FINAL DX SPEC: NORMAL
PATH REPORT.GROSS SPEC: NORMAL
PATH REPORT.MICROSCOPIC SPEC OTHER STN: NORMAL
PATH REPORT.RELEVANT HX SPEC: NORMAL

## 2023-10-05 ENCOUNTER — OFFICE VISIT (OUTPATIENT)
Dept: OBGYN | Facility: CLINIC | Age: 23
End: 2023-10-05
Payer: COMMERCIAL

## 2023-10-05 VITALS
TEMPERATURE: 97 F | BODY MASS INDEX: 29.2 KG/M2 | HEART RATE: 73 BPM | SYSTOLIC BLOOD PRESSURE: 129 MMHG | WEIGHT: 181.7 LBS | RESPIRATION RATE: 16 BRPM | HEIGHT: 66 IN | DIASTOLIC BLOOD PRESSURE: 79 MMHG

## 2023-10-05 DIAGNOSIS — N90.7 LABIAL CYST: ICD-10-CM

## 2023-10-05 DIAGNOSIS — Z30.432 ENCOUNTER FOR REMOVAL OF INTRAUTERINE CONTRACEPTIVE DEVICE: Primary | ICD-10-CM

## 2023-10-05 PROBLEM — Z30.430 ENCOUNTER FOR INSERTION OF MIRENA IUD: Status: RESOLVED | Noted: 2021-06-17 | Resolved: 2023-10-05

## 2023-10-05 PROCEDURE — 58301 REMOVE INTRAUTERINE DEVICE: CPT | Performed by: ADVANCED PRACTICE MIDWIFE

## 2023-10-05 NOTE — NURSING NOTE
"Initial /79 (BP Location: Right arm, Patient Position: Chair, Cuff Size: Adult Regular)   Pulse 73   Temp 97  F (36.1  C) (Tympanic)   Resp 16   Ht 1.676 m (5' 6\")   Wt 82.4 kg (181 lb 11.2 oz)   BMI 29.33 kg/m   Estimated body mass index is 29.33 kg/m  as calculated from the following:    Height as of this encounter: 1.676 m (5' 6\").    Weight as of this encounter: 82.4 kg (181 lb 11.2 oz). .    Kyra Silverman, MAURICIO    "

## 2023-10-05 NOTE — PROGRESS NOTES
IUD Removal:  SUBJECTIVE:    Is a pregnancy test required: No.  Was a consent obtained?  Yes    Tiana Sumner is a 23 year old female,No obstetric history on file., No LMP recorded. (Menstrual status: IUD). who presents today for IUD removal. Her current IUD was placed 2 years ago. She has not had problems with the IUD. She requests removal of the IUD because she wants to change her method of contraception. She is getting  soon. She is sure she wants the IUD out.  They will use condoms as needed if they are not ready for pregnancy.  She is also worried about a bump on her labia.      Today's PHQ-2 Score:       8/7/2019     1:54 PM   PHQ-2 ( 1999 Pfizer)   Q1: Little interest or pleasure in doing things 0   Q2: Feeling down, depressed or hopeless 0   PHQ-2 Score 0   PHQ-2 Total Score (12-17 Years)- Positive if 3 or more points; Administer PHQ-A if positive 0       PROCEDURE:    A speculum exam was performed and the cervix was visualized. The IUD string was visualized. Using ring forceps, the string  was grasped and the IUD removed intact.    POST PROCEDURE:    The patient tolerated the procedure well. Patient was discharged in stable condition.    Call if bleeding, pain or fever occur., Birth control counseling given., Pregnancy counseling given, including folic acid supplementation 800-1000 mg per day., and Use of condoms/foam discussed.    Vulvar cyst - She can try hot packing it, but I recommended she see an OBGyn MD for evaluation and treatment.      Brynn Pak CNM

## 2023-11-29 DIAGNOSIS — F41.1 GAD (GENERALIZED ANXIETY DISORDER): ICD-10-CM

## 2023-11-29 RX ORDER — HYDROXYZINE PAMOATE 50 MG/1
CAPSULE ORAL
Qty: 90 CAPSULE | Refills: 0 | Status: SHIPPED | OUTPATIENT
Start: 2023-11-29 | End: 2024-03-08

## 2024-02-20 ENCOUNTER — VIRTUAL VISIT (OUTPATIENT)
Dept: FAMILY MEDICINE | Facility: CLINIC | Age: 24
End: 2024-02-20
Payer: COMMERCIAL

## 2024-02-20 DIAGNOSIS — B97.89 VIRAL SINUSITIS: ICD-10-CM

## 2024-02-20 DIAGNOSIS — J45.20 MILD INTERMITTENT ASTHMA WITHOUT COMPLICATION: ICD-10-CM

## 2024-02-20 DIAGNOSIS — J32.9 VIRAL SINUSITIS: ICD-10-CM

## 2024-02-20 PROCEDURE — 99441 PR PHYSICIAN TELEPHONE EVALUATION 5-10 MIN: CPT | Mod: 93 | Performed by: STUDENT IN AN ORGANIZED HEALTH CARE EDUCATION/TRAINING PROGRAM

## 2024-02-20 RX ORDER — ALBUTEROL SULFATE 0.83 MG/ML
2.5 SOLUTION RESPIRATORY (INHALATION) EVERY 6 HOURS PRN
Qty: 3 ML | Refills: 2 | Status: SHIPPED | OUTPATIENT
Start: 2024-02-20

## 2024-02-20 NOTE — LETTER
February 20, 2024      Tiana Sumner  [address] [facility] [address]        To Whom It May Concern:    Tiana Sumner  was seen on 2/20/24.  Please excuse her due to illness.        Sincerely,        CARIN COSTELLO MD

## 2024-02-20 NOTE — PATIENT INSTRUCTIONS
Hello! It was a pleasure seeing you today. Just some things we discussed at today's visit:     Congestion    You can ask the pharmacist for Allegra-D (please bring a photo ID)   Taking Flonase can also help with mucous production in the sinuses  If you want to try Afrin nasal spray, please do not use it for more than 3 days  Using a humidifier can also help with congestion   For your cough ok to try Mucinex   If your symptoms persistent for longer than 10 days, you experience fevers, please notify us via mychart as you may qualify for antibiotics at that time     Sincerely,     Laura So MD

## 2024-02-20 NOTE — PROGRESS NOTES
Tiana is a 24 year old who is being evaluated via a billable telephone visit.      What phone number would you like to be contacted at? [phone]  How would you like to obtain your AVS? MyChart    Distant Location (provider location):  On-site    Assessment & Plan     Viral Sinusitis   > at this time given duration of symptoms and lack of fevers, higher suspcion for viral etiology of symptoms rather than bacterial   - recommended patient trial Allegra-D  - ok to use Flonase   - if using Afrin, do not use for more than 3 days straight   - encouraged humidifier use or taking long hot/steamy showers   - encouraged mucinex use for cough   - supportive care measures discussed, patient aware that if her symptoms last longer than 10-14 without improving and/or she starts to experience fevers to message provider on mychart as she may then benefit from antibiotics     Mild intermittent asthma without complication  > asthma appears to be well controlled but patient admits she is running out of her albuterol nebulizer medications   - refilled albuterol (PROVENTIL) (2.5 MG/3ML) 0.083% neb solution; Take 1 vial (2.5 mg) by nebulization every 6 hours as needed for shortness of breath or wheezing    The risks, benefits and treatment options of prescribed medications or other treatments have been discussed with the patient. The patient verbalized their understanding and should call or follow up if no improvement or if they develop further problems.      Subjective   Tiana is a 24 year old, presenting for the following health issues:  Cold Symptoms and Letter for School/Work (Missing work )        2/20/2024     9:26 AM   Additional Questions   Roomed by Leonor LOZANO LPN   Accompanied by self         2/20/2024     9:26 AM   Patient Reported Additional Medications   Patient reports taking the following new medications no no meds     History of Present Illness       Reason for visit:  Possible sinus infection cold  symptoms  Symptom onset:  3-7 days ago  Symptoms include:  Cough congestion sinus pressure and pain headache cough  Symptom intensity:  Severe  Symptom progression:  Staying the same  Had these symptoms before:  No  What makes it worse:  Laying down  What makes it better:  No    She eats 0-1 servings of fruits and vegetables daily.She consumes 0 sweetened beverage(s) daily.She exercises with enough effort to increase her heart rate 30 to 60 minutes per day.  She exercises with enough effort to increase her heart rate 6 days per week.   She is taking medications regularly.     Acute Illness  Acute illness concerns: Cold symptoms  Onset/Duration: about 5 days - negative for Covid-19  Symptoms:  Fever: No  Chills/Sweats: No  Headache (location?): YES  Sinus Pressure: YES  Conjunctivitis:  No  Ear Pain: YES: both - yesterday  Rhinorrhea: No, stuffy not runny  Congestion: YES  Sore Throat: Not currently  Cough: YES-productive of yellow sputum, with shortness of breath but has asthma as well - breathing seems to be getting better she states   Wheeze: No  Nausea: No  Vomiting: No  Fatigue/Achiness: YES  Sick/Strep Exposure: No  Therapies tried and outcome: Albuterol nebulizer helps loosen her chest, has tried dayquil/nyquil but it isn't helpful. Hasn't been using her inhaler    Denies any medication allergies and no history of elevated blood pressure      ROS:   As above         Objective    Vitals - Patient Reported  Pain Score: Severe Pain (6)  Pain Loc: Face        Physical Exam   General: Alert and no distress //Respiratory: Sounds congested, No audible wheeze, cough, or shortness of breath // Psychiatric:  Appropriate affect, tone, and pace of words          Phone call duration: 8 minutes 43 seconds   Signed Electronically by: CARIN COSTELLO MD

## 2024-03-07 ASSESSMENT — PATIENT HEALTH QUESTIONNAIRE - PHQ9
SUM OF ALL RESPONSES TO PHQ QUESTIONS 1-9: 13
10. IF YOU CHECKED OFF ANY PROBLEMS, HOW DIFFICULT HAVE THESE PROBLEMS MADE IT FOR YOU TO DO YOUR WORK, TAKE CARE OF THINGS AT HOME, OR GET ALONG WITH OTHER PEOPLE: VERY DIFFICULT
SUM OF ALL RESPONSES TO PHQ QUESTIONS 1-9: 13

## 2024-03-07 ASSESSMENT — ANXIETY QUESTIONNAIRES
7. FEELING AFRAID AS IF SOMETHING AWFUL MIGHT HAPPEN: MORE THAN HALF THE DAYS
6. BECOMING EASILY ANNOYED OR IRRITABLE: SEVERAL DAYS
2. NOT BEING ABLE TO STOP OR CONTROL WORRYING: NEARLY EVERY DAY
GAD7 TOTAL SCORE: 17
1. FEELING NERVOUS, ANXIOUS, OR ON EDGE: NEARLY EVERY DAY
7. FEELING AFRAID AS IF SOMETHING AWFUL MIGHT HAPPEN: MORE THAN HALF THE DAYS
GAD7 TOTAL SCORE: 17
3. WORRYING TOO MUCH ABOUT DIFFERENT THINGS: NEARLY EVERY DAY
4. TROUBLE RELAXING: NEARLY EVERY DAY
5. BEING SO RESTLESS THAT IT IS HARD TO SIT STILL: MORE THAN HALF THE DAYS
IF YOU CHECKED OFF ANY PROBLEMS ON THIS QUESTIONNAIRE, HOW DIFFICULT HAVE THESE PROBLEMS MADE IT FOR YOU TO DO YOUR WORK, TAKE CARE OF THINGS AT HOME, OR GET ALONG WITH OTHER PEOPLE: EXTREMELY DIFFICULT
8. IF YOU CHECKED OFF ANY PROBLEMS, HOW DIFFICULT HAVE THESE MADE IT FOR YOU TO DO YOUR WORK, TAKE CARE OF THINGS AT HOME, OR GET ALONG WITH OTHER PEOPLE?: EXTREMELY DIFFICULT
GAD7 TOTAL SCORE: 17

## 2024-03-08 ENCOUNTER — OFFICE VISIT (OUTPATIENT)
Dept: FAMILY MEDICINE | Facility: CLINIC | Age: 24
End: 2024-03-08
Payer: COMMERCIAL

## 2024-03-08 VITALS
TEMPERATURE: 98.5 F | SYSTOLIC BLOOD PRESSURE: 104 MMHG | OXYGEN SATURATION: 99 % | WEIGHT: 179.3 LBS | RESPIRATION RATE: 16 BRPM | DIASTOLIC BLOOD PRESSURE: 70 MMHG | BODY MASS INDEX: 28.94 KG/M2 | HEART RATE: 94 BPM

## 2024-03-08 DIAGNOSIS — F32.1 CURRENT MODERATE EPISODE OF MAJOR DEPRESSIVE DISORDER WITHOUT PRIOR EPISODE (H): Primary | ICD-10-CM

## 2024-03-08 DIAGNOSIS — F41.1 GAD (GENERALIZED ANXIETY DISORDER): ICD-10-CM

## 2024-03-08 DIAGNOSIS — E66.3 OVERWEIGHT (BMI 25.0-29.9): ICD-10-CM

## 2024-03-08 PROCEDURE — 99214 OFFICE O/P EST MOD 30 MIN: CPT | Performed by: NURSE PRACTITIONER

## 2024-03-08 RX ORDER — HYDROXYZINE PAMOATE 50 MG/1
50 CAPSULE ORAL AT BEDTIME
Qty: 90 CAPSULE | Refills: 3 | Status: SHIPPED | OUTPATIENT
Start: 2024-03-08 | End: 2024-07-24

## 2024-03-08 RX ORDER — LORAZEPAM 0.5 MG/1
0.5 TABLET ORAL DAILY PRN
Qty: 15 TABLET | Refills: 1 | Status: SHIPPED | OUTPATIENT
Start: 2024-03-08

## 2024-03-08 RX ORDER — PHENTERMINE HYDROCHLORIDE 15 MG/1
15 CAPSULE ORAL EVERY MORNING
Qty: 30 CAPSULE | Refills: 1 | Status: SHIPPED | OUTPATIENT
Start: 2024-03-08 | End: 2024-07-24

## 2024-03-08 RX ORDER — VENLAFAXINE HYDROCHLORIDE 150 MG/1
150 CAPSULE, EXTENDED RELEASE ORAL DAILY
Qty: 90 CAPSULE | Refills: 2 | Status: SHIPPED | OUTPATIENT
Start: 2024-03-08 | End: 2024-07-24

## 2024-03-08 RX ORDER — BUSPIRONE HYDROCHLORIDE 5 MG/1
5 TABLET ORAL 2 TIMES DAILY
Qty: 60 TABLET | Refills: 2 | Status: SHIPPED | OUTPATIENT
Start: 2024-03-08 | End: 2024-07-24

## 2024-03-08 ASSESSMENT — ENCOUNTER SYMPTOMS: NERVOUS/ANXIOUS: 1

## 2024-03-08 ASSESSMENT — PAIN SCALES - GENERAL: PAINLEVEL: NO PAIN (0)

## 2024-03-08 NOTE — PROGRESS NOTES
Assessment & Plan     Current moderate episode of major depressive disorder without prior episode (H)    - busPIRone (BUSPAR) 5 MG tablet; Take 1 tablet (5 mg) by mouth 2 times daily  - Adult Mental Health  Referral; Future  - venlafaxine (EFFEXOR XR) 150 MG 24 hr capsule; Take 1 capsule (150 mg) by mouth daily  -if no improvement will plan referral to psychiatrist     JAZMIN (generalized anxiety disorder)  -tried several medications in the past Zoloft, Prozac without improvement, recommended to add Buspar, continue Effexor as prescribed and start therapy   - busPIRone (BUSPAR) 5 MG tablet; Take 1 tablet (5 mg) by mouth 2 times daily  - Adult Mental Health  Referral; Future  - hydrOXYzine (VISTARIL) 50 MG capsule; Take 1 capsule (50 mg) by mouth at bedtime  - LORazepam (ATIVAN) 0.5 MG tablet; Take 1 tablet (0.5 mg) by mouth daily as needed for anxiety  - venlafaxine (EFFEXOR XR) 150 MG 24 hr capsule; Take 1 capsule (150 mg) by mouth daily    Overweight (BMI 25.0-29.9)  -recommended to start healthy diet, exercise   - phentermine 15 MG capsule; Take 1 capsule (15 mg) by mouth every morning, for short term use, advised patient that Phentermine can be used only for about up to 12 weeks         Christa Montiel is a 24 year old, presenting for the following health issues:  Anxiety and Weight Problem (Would like to to discuss medication for weight loss )        3/8/2024     6:43 AM   Additional Questions   Roomed by partha   Accompanied by self         3/8/2024     6:43 AM   Patient Reported Additional Medications   Patient reports taking the following new medications none     Chief Complaint   Patient presents with    Anxiety    Weight Problem     Would like to to discuss medication for weight loss        History of Present Illness       Mental Health Follow-up:  Patient presents to follow-up on Anxiety.    Patient's anxiety since last visit has been:  Worse  The patient is not having other  symptoms associated with anxiety.  Any significant life events: other  Patient is feeling anxious or having panic attacks.  Patient has no concerns about alcohol or drug use.    Reason for visit:  Discuss anxiety medication/ renewal and diet medication    She eats 0-1 servings of fruits and vegetables daily.She consumes 0 sweetened beverage(s) daily.She exercises with enough effort to increase her heart rate 60 or more minutes per day.  She exercises with enough effort to increase her heart rate 6 days per week.   She is taking medications regularly.       Review of Systems  Constitutional, HEENT, cardiovascular, pulmonary, gi and gu systems are negative, except as otherwise noted.      Objective    /70   Pulse 94   Temp 98.5  F (36.9  C) (Tympanic)   Resp 16   Wt 81.3 kg (179 lb 4.8 oz)   LMP 02/02/2024 (Approximate)   SpO2 99%   BMI 28.94 kg/m    Body mass index is 28.94 kg/m .  Physical Exam   GENERAL: alert and no distress  EYES: Eyes grossly normal to inspection, PERRL and conjunctivae and sclerae normal  CV: regular rate and rhythm, normal S1 S2, no S3 or S4, no murmur, click or rub, no peripheral edema   NEURO: Normal strength and tone, mentation intact and speech normal  PSYCH: mentation appears normal, affect normal/bright            Signed Electronically by: KATELYN Blevins CNP

## 2024-04-11 ENCOUNTER — MYC REFILL (OUTPATIENT)
Dept: FAMILY MEDICINE | Facility: CLINIC | Age: 24
End: 2024-04-11
Payer: COMMERCIAL

## 2024-04-11 DIAGNOSIS — F32.1 CURRENT MODERATE EPISODE OF MAJOR DEPRESSIVE DISORDER WITHOUT PRIOR EPISODE (H): ICD-10-CM

## 2024-04-11 DIAGNOSIS — F41.1 GAD (GENERALIZED ANXIETY DISORDER): ICD-10-CM

## 2024-04-12 RX ORDER — VENLAFAXINE HYDROCHLORIDE 150 MG/1
150 CAPSULE, EXTENDED RELEASE ORAL DAILY
Qty: 90 CAPSULE | Refills: 2 | OUTPATIENT
Start: 2024-04-12

## 2024-06-16 ENCOUNTER — HEALTH MAINTENANCE LETTER (OUTPATIENT)
Age: 24
End: 2024-06-16

## 2024-07-23 ASSESSMENT — ANXIETY QUESTIONNAIRES
GAD7 TOTAL SCORE: 2
7. FEELING AFRAID AS IF SOMETHING AWFUL MIGHT HAPPEN: NOT AT ALL
5. BEING SO RESTLESS THAT IT IS HARD TO SIT STILL: NOT AT ALL
2. NOT BEING ABLE TO STOP OR CONTROL WORRYING: NOT AT ALL
7. FEELING AFRAID AS IF SOMETHING AWFUL MIGHT HAPPEN: NOT AT ALL
IF YOU CHECKED OFF ANY PROBLEMS ON THIS QUESTIONNAIRE, HOW DIFFICULT HAVE THESE PROBLEMS MADE IT FOR YOU TO DO YOUR WORK, TAKE CARE OF THINGS AT HOME, OR GET ALONG WITH OTHER PEOPLE: SOMEWHAT DIFFICULT
8. IF YOU CHECKED OFF ANY PROBLEMS, HOW DIFFICULT HAVE THESE MADE IT FOR YOU TO DO YOUR WORK, TAKE CARE OF THINGS AT HOME, OR GET ALONG WITH OTHER PEOPLE?: SOMEWHAT DIFFICULT
1. FEELING NERVOUS, ANXIOUS, OR ON EDGE: SEVERAL DAYS
4. TROUBLE RELAXING: SEVERAL DAYS
GAD7 TOTAL SCORE: 2
GAD7 TOTAL SCORE: 2
6. BECOMING EASILY ANNOYED OR IRRITABLE: NOT AT ALL
3. WORRYING TOO MUCH ABOUT DIFFERENT THINGS: NOT AT ALL

## 2024-07-23 ASSESSMENT — PATIENT HEALTH QUESTIONNAIRE - PHQ9
SUM OF ALL RESPONSES TO PHQ QUESTIONS 1-9: 1
10. IF YOU CHECKED OFF ANY PROBLEMS, HOW DIFFICULT HAVE THESE PROBLEMS MADE IT FOR YOU TO DO YOUR WORK, TAKE CARE OF THINGS AT HOME, OR GET ALONG WITH OTHER PEOPLE: NOT DIFFICULT AT ALL
SUM OF ALL RESPONSES TO PHQ QUESTIONS 1-9: 1

## 2024-07-23 ASSESSMENT — ASTHMA QUESTIONNAIRES
ACT_TOTALSCORE: 25
QUESTION_5 LAST FOUR WEEKS HOW WOULD YOU RATE YOUR ASTHMA CONTROL: COMPLETELY CONTROLLED
QUESTION_4 LAST FOUR WEEKS HOW OFTEN HAVE YOU USED YOUR RESCUE INHALER OR NEBULIZER MEDICATION (SUCH AS ALBUTEROL): NOT AT ALL
ACT_TOTALSCORE: 25
QUESTION_2 LAST FOUR WEEKS HOW OFTEN HAVE YOU HAD SHORTNESS OF BREATH: NOT AT ALL
QUESTION_1 LAST FOUR WEEKS HOW MUCH OF THE TIME DID YOUR ASTHMA KEEP YOU FROM GETTING AS MUCH DONE AT WORK, SCHOOL OR AT HOME: NONE OF THE TIME
QUESTION_3 LAST FOUR WEEKS HOW OFTEN DID YOUR ASTHMA SYMPTOMS (WHEEZING, COUGHING, SHORTNESS OF BREATH, CHEST TIGHTNESS OR PAIN) WAKE YOU UP AT NIGHT OR EARLIER THAN USUAL IN THE MORNING: NOT AT ALL

## 2024-07-24 ENCOUNTER — VIRTUAL VISIT (OUTPATIENT)
Dept: FAMILY MEDICINE | Facility: CLINIC | Age: 24
End: 2024-07-24
Payer: COMMERCIAL

## 2024-07-24 DIAGNOSIS — F41.1 GAD (GENERALIZED ANXIETY DISORDER): ICD-10-CM

## 2024-07-24 DIAGNOSIS — F32.1 CURRENT MODERATE EPISODE OF MAJOR DEPRESSIVE DISORDER WITHOUT PRIOR EPISODE (H): Primary | ICD-10-CM

## 2024-07-24 PROCEDURE — 99441 PR PHYSICIAN TELEPHONE EVALUATION 5-10 MIN: CPT | Mod: 93 | Performed by: NURSE PRACTITIONER

## 2024-07-24 RX ORDER — VENLAFAXINE HYDROCHLORIDE 37.5 MG/1
37.5 CAPSULE, EXTENDED RELEASE ORAL DAILY
Qty: 14 CAPSULE | Refills: 0 | Status: SHIPPED | OUTPATIENT
Start: 2024-07-24 | End: 2024-08-09

## 2024-07-24 RX ORDER — VENLAFAXINE HYDROCHLORIDE 75 MG/1
75 CAPSULE, EXTENDED RELEASE ORAL DAILY
Qty: 21 CAPSULE | Refills: 0 | Status: SHIPPED | OUTPATIENT
Start: 2024-07-24 | End: 2024-08-09

## 2024-07-24 NOTE — PROGRESS NOTES
Tiana is a 24 year old who is being evaluated via a billable telephone visit.    What phone number would you like to be contacted at? [phone]  How would you like to obtain your AVS? Lydia  Originating Location (pt. Location): Home    Distant Location (provider location):  On-site    Assessment & Plan     Current moderate episode of major depressive disorder without prior episode (H)  -patient is doing well and would like to go off Effexor  -recommended gradually taper down over 5 weeks. Advised patient to contact me thrugh my chart if this plan will not work well for her, or if she decide to stay on Effexor or possibly continue with lower dose  - venlafaxine (EFFEXOR XR) 75 MG 24 hr capsule; Take 1 capsule (75 mg) by mouth daily for 21 days Than reduce to 37.5 mg daily  - venlafaxine (EFFEXOR XR) 37.5 MG 24 hr capsule; Take 1 capsule (37.5 mg) by mouth daily for 14 days    JAZMIN (generalized anxiety disorder)    - venlafaxine (EFFEXOR XR) 75 MG 24 hr capsule; Take 1 capsule (75 mg) by mouth daily for 21 days Than reduce to 37.5 mg daily  - venlafaxine (EFFEXOR XR) 37.5 MG 24 hr capsule; Take 1 capsule (37.5 mg) by mouth daily for 14 days      Subjective   Tiana is a 24 year old, presenting for the following health issues:  Anxiety (Would like to discuss weaning off her anxiety meds )        7/24/2024     7:06 AM   Additional Questions   Roomed by partha   Accompanied by self         7/24/2024     7:06 AM   Patient Reported Additional Medications   Patient reports taking the following new medications none     History of Present Illness       Mental Health Follow-up:  Patient presents to follow-up on Anxiety.    Patient's anxiety since last visit has been:  Good  The patient is not having other symptoms associated with anxiety.  Any significant life events: No  Patient is not feeling anxious or having panic attacks.  Patient has no concerns about alcohol or drug use.    She eats 0-1 servings of fruits and  vegetables daily.She consumes 1 sweetened beverage(s) daily.She exercises with enough effort to increase her heart rate 60 or more minutes per day.  She exercises with enough effort to increase her heart rate 6 days per week.   She is taking medications regularly.         Review of Systems  Constitutional, HEENT, cardiovascular, pulmonary, gi and gu systems are negative, except as otherwise noted.      Objective           Vitals:  No vitals were obtained today due to virtual visit.    Physical Exam   General: Alert and no distress //Respiratory: No audible wheeze, cough, or shortness of breath // Psychiatric:  Appropriate affect, tone, and pace of words            Phone call duration: 7 minutes  Signed Electronically by: KATELYN Blevins CNP

## 2024-08-27 ENCOUNTER — MYC MEDICAL ADVICE (OUTPATIENT)
Dept: FAMILY MEDICINE | Facility: CLINIC | Age: 24
End: 2024-08-27
Payer: COMMERCIAL

## 2024-08-27 DIAGNOSIS — F41.1 GAD (GENERALIZED ANXIETY DISORDER): Primary | ICD-10-CM

## 2024-08-27 RX ORDER — VENLAFAXINE HYDROCHLORIDE 37.5 MG/1
37.5 CAPSULE, EXTENDED RELEASE ORAL DAILY
Qty: 30 CAPSULE | Refills: 1 | Status: SHIPPED | OUTPATIENT
Start: 2024-08-27

## 2024-08-27 NOTE — TELEPHONE ENCOUNTER
"Patient requesting to do one more month on the venlafaxine (EFFEXOR XR) 37.5 MG 24 hr capsule just to \"make sure I get used to this dose and then after I can come off?\"    Virtual visit on 7/24/2024:  Current moderate episode of major depressive disorder without prior episode (H)  -patient is doing well and would like to go off Effexor  -recommended gradually taper down over 5 weeks. Advised patient to contact me thrugh my chart if this plan will not work well for her, or if she decide to stay on Effexor or possibly continue with lower dose  - venlafaxine (EFFEXOR XR) 75 MG 24 hr capsule; Take 1 capsule (75 mg) by mouth daily for 21 days Than reduce to 37.5 mg daily  - venlafaxine (EFFEXOR XR) 37.5 MG 24 hr capsule; Take 1 capsule (37.5 mg) by mouth daily for 14 days    Will route to provider for consideration. Please advise.     Lakesha LOZANO RN  [facility]  [phone]   "

## 2024-10-09 ENCOUNTER — TELEPHONE (OUTPATIENT)
Dept: FAMILY MEDICINE | Facility: CLINIC | Age: 24
End: 2024-10-09
Payer: COMMERCIAL

## 2024-10-09 NOTE — LETTER
October 9, 2024    To  Tiana Sumner  [id number] [facility] [address]    Your team at [facility] cares about your health. We have reviewed your chart and based on our findings; we are making the following recommendations to better manage your health.     You are in particular need of attention regarding the following:     Schedule a primary care office visit with your provider for a Pap Smear to screen for Cervical Cancer.    If you have already completed these items, please contact the clinic via phone or   [facility] so your care team can review and update your records. Thank you for   choosing [facility] for your healthcare needs. For any questions,   concerns, or to schedule an appointment please contact our clinic.    Healthy Regards,      Your [facility] Care Team

## 2024-10-09 NOTE — TELEPHONE ENCOUNTER
Patient Quality Outreach    Patient is due for the following:   Cervical Cancer Screening - PAP Needed    Next Steps:   Schedule a Adult Preventative    Type of outreach:    Sent letter.    Next Steps:  Reach out within 90 days via Letter.    Max number of attempts reached: No. Will try again in 90 days if patient still on fail list.    Questions for provider review:    None           Aidee Wolfe CMA  Chart routed to none.

## 2024-10-20 DIAGNOSIS — F41.1 GAD (GENERALIZED ANXIETY DISORDER): ICD-10-CM

## 2024-10-21 RX ORDER — VENLAFAXINE HYDROCHLORIDE 37.5 MG/1
37.5 CAPSULE, EXTENDED RELEASE ORAL DAILY
Qty: 30 CAPSULE | Refills: 1 | Status: SHIPPED | OUTPATIENT
Start: 2024-10-21

## 2024-11-27 ENCOUNTER — VIRTUAL VISIT (OUTPATIENT)
Dept: FAMILY MEDICINE | Facility: CLINIC | Age: 24
End: 2024-11-27
Payer: COMMERCIAL

## 2024-11-27 DIAGNOSIS — J20.9 ACUTE BRONCHITIS WITH SYMPTOMS > 10 DAYS: Primary | ICD-10-CM

## 2024-11-27 RX ORDER — PREDNISONE 20 MG/1
20 TABLET ORAL 2 TIMES DAILY
Qty: 10 TABLET | Refills: 0 | Status: SHIPPED | OUTPATIENT
Start: 2024-11-27 | End: 2024-12-02

## 2024-11-27 RX ORDER — BENZONATATE 100 MG/1
100 CAPSULE ORAL 3 TIMES DAILY PRN
Qty: 21 CAPSULE | Refills: 0 | Status: SHIPPED | OUTPATIENT
Start: 2024-11-27

## 2024-11-27 RX ORDER — AZITHROMYCIN 250 MG/1
TABLET, FILM COATED ORAL
Qty: 6 TABLET | Refills: 0 | Status: SHIPPED | OUTPATIENT
Start: 2024-11-27

## 2024-11-27 NOTE — PROGRESS NOTES
"Tiana is a 24 year old who is being evaluated via a billable telephone visit.    What phone number would you like to be contacted at? [phone]   How would you like to obtain your AVS? MyChart  Originating Location (pt. Location): {patient location:386997::\"Home\"}  {PROVIDER LOCATION On-site should be selected for visits conducted from your clinic location or adjoining [facility] hospital, academic office, or other nearby [facility] building. Off-site should be selected for all other provider locations, including home:730617}  Distant Location (provider location):  {virtual location provider:023268}    {PROVIDER CHARTING PREFERENCE:784775}    Subjective   Tiana is a 24 year old, presenting for the following health issues:  Cough      11/27/2024    11:12 AM   Additional Questions   Roomed by partha   Accompanied by self         11/27/2024    11:12 AM   Patient Reported Additional Medications   Patient reports taking the following new medications none     HPI     {SUPERLIST (Optional):407272}  Acute Illness  Acute illness concerns: COUGH   Onset/Duration: one month   Symptoms:  Fever: No  Chills/Sweats: No  Headache (location?): YES  Sinus Pressure: No  Conjunctivitis:  No  Ear Pain: no  Rhinorrhea: No  Congestion: YES  Sore Throat: No  Cough: YES- dry   Wheeze: No  Decreased Appetite: No  Nausea: No  Vomiting: No  Diarrhea: No  Dysuria/Freq.: No  Dysuria or Hematuria: No  Fatigue/Achiness: YES   Sick/Strep Exposure: No  Therapies tried and outcome: inhaler, neb, nyquil, dayquil    {ROS Picklists (Optional):104661}      Objective           Vitals:  No vitals were obtained today due to virtual visit.    Physical Exam   General: Alert and no distress //Respiratory: No audible wheeze, cough, or shortness of breath // Psychiatric:  Appropriate affect, tone, and pace of words      {Diagnostic Test Results (Optional):133711}      Phone call duration: *** minutes  Signed Electronically by: KATELYN Blevins CNP  {Email " feedback regarding this note to [email or web address]   :782363}

## 2024-12-07 ENCOUNTER — OFFICE VISIT (OUTPATIENT)
Dept: URGENT CARE | Facility: URGENT CARE | Age: 24
End: 2024-12-07
Payer: COMMERCIAL

## 2024-12-07 VITALS
SYSTOLIC BLOOD PRESSURE: 126 MMHG | DIASTOLIC BLOOD PRESSURE: 85 MMHG | HEART RATE: 71 BPM | WEIGHT: 195 LBS | BODY MASS INDEX: 31.47 KG/M2 | RESPIRATION RATE: 16 BRPM | TEMPERATURE: 98.2 F | OXYGEN SATURATION: 98 %

## 2024-12-07 DIAGNOSIS — J02.9 SORE THROAT: Primary | ICD-10-CM

## 2024-12-07 DIAGNOSIS — R05.2 SUBACUTE COUGH: ICD-10-CM

## 2024-12-07 LAB
DEPRECATED S PYO AG THROAT QL EIA: NEGATIVE
GROUP A STREP BY PCR: NOT DETECTED

## 2024-12-07 PROCEDURE — 99213 OFFICE O/P EST LOW 20 MIN: CPT | Performed by: EMERGENCY MEDICINE

## 2024-12-07 PROCEDURE — 87651 STREP A DNA AMP PROBE: CPT | Performed by: EMERGENCY MEDICINE

## 2024-12-07 RX ORDER — PREDNISONE 20 MG/1
TABLET ORAL
Qty: 20 TABLET | Refills: 0 | Status: SHIPPED | OUTPATIENT
Start: 2024-12-07

## 2024-12-07 RX ORDER — ALBUTEROL SULFATE 0.83 MG/ML
2.5 SOLUTION RESPIRATORY (INHALATION) EVERY 6 HOURS PRN
Qty: 90 ML | Refills: 1 | Status: SHIPPED | OUTPATIENT
Start: 2024-12-07

## 2024-12-07 RX ORDER — CLOTRIMAZOLE 10 MG/1
10 LOZENGE ORAL 4 TIMES DAILY
Qty: 28 LOZENGE | Refills: 0 | Status: SHIPPED | OUTPATIENT
Start: 2024-12-07 | End: 2024-12-14

## 2024-12-07 RX ORDER — ALBUTEROL SULFATE 90 UG/1
2 INHALANT RESPIRATORY (INHALATION) 4 TIMES DAILY
Qty: 18 G | Refills: 2 | Status: SHIPPED | OUTPATIENT
Start: 2024-12-07

## 2024-12-07 NOTE — PROGRESS NOTES
CHIEF COMPLAINT: Persistent cough.  Sore throat.      HPI: Patient is a 24-year-old female whose had a several week history of cough.  She was seen in the recent past and treated with antibiotics with slight improvement.  She does have a history of asthma and does feel her nebulizer and inhaler to help at times.  Subsequent to being treated with antibiotic she has developed a sore throat and does feel like her tongue hurts.      ROS: See HPI otherwise normal.    No Known Allergies   Current Outpatient Medications   Medication Sig Dispense Refill    albuterol (PROAIR HFA/PROVENTIL HFA/VENTOLIN HFA) 108 (90 Base) MCG/ACT inhaler Inhale 2 puffs into the lungs 4 times daily. 18 g 2    albuterol (PROVENTIL) (2.5 MG/3ML) 0.083% neb solution Take 1 vial (2.5 mg) by nebulization every 6 hours as needed for shortness of breath, wheezing or cough. 90 mL 1    clotrimazole (MYCELEX) 10 MG lozenge Place 1 lozenge (10 mg) inside cheek 4 times daily for 7 days. 28 lozenge 0    predniSONE (DELTASONE) 20 MG tablet Take 3 tabs by mouth daily x 3 days, then 2 tabs daily x 3 days, then 1 tab daily x 3 days, then 1/2 tab daily x 3 days. 20 tablet 0    albuterol (PROAIR HFA/PROVENTIL HFA/VENTOLIN HFA) 108 (90 Base) MCG/ACT inhaler Inhale 2 puffs into the lungs every 6 hours 1 Inhaler 5    albuterol (PROVENTIL) (2.5 MG/3ML) 0.083% neb solution Take 1 vial (2.5 mg) by nebulization every 6 hours as needed for shortness of breath or wheezing 3 mL 2    azithromycin (ZITHROMAX) 250 MG tablet Two tablets first day, then one tablet daily for four days. 6 tablet 0    benzonatate (TESSALON) 100 MG capsule Take 1 capsule (100 mg) by mouth 3 times daily as needed for cough. 21 capsule 0    LORazepam (ATIVAN) 0.5 MG tablet Take 1 tablet (0.5 mg) by mouth daily as needed for anxiety (Patient not taking: Reported on 12/7/2024) 15 tablet 1    triamcinolone (KENALOG) 0.1 % external cream Apply topically 2 times daily (Patient not taking: Reported on  3/8/2024) 80 g 1    venlafaxine (EFFEXOR XR) 37.5 MG 24 hr capsule TAKE 1 CAPSULE (37.5 MG) BY MOUTH DAILY. 30 capsule 1         PE: No acute distress.  Afebrile.  Pulse ox 98%.  No dysphonia.  HEENT reveals moist oral mucous membranes.  Patient has patchy vesicles and erythema on her hard palate.  No herminio yeast seen.  Lungs are clear throughout with no wheezes rales or rhonchi heard.  Heart is regular.        TREATMENT: Rapid strep negative.  PCR sent.      ASSESSMENT: Sore tongue and throat are suspicious for thrush.  Will we will plan to treat empirically with clotrimazole with short-term follow-up if not better.  Prednisone taper for her cough will be instituted as well.      DIAGNOSIS: Cough.  Pharyngitis.     PLAN: Empiric clotrimazole troches.  Prednisone taper for cough.  Follow-up 4 to 5 days if sore throat does not improve, sooner if worse.  Follow-up with PCP in 7 to 10 days if cough continues.

## 2024-12-07 NOTE — PATIENT INSTRUCTIONS
Clotrimazole for thrush  Prednisone taper for cough  Recheck of throat and mouth in 4 to 5 days if not better, sooner if worse.  Follow-up with PCP in 7 to 10 days if cough continues.